# Patient Record
Sex: MALE | Race: WHITE | NOT HISPANIC OR LATINO | Employment: STUDENT | URBAN - METROPOLITAN AREA
[De-identification: names, ages, dates, MRNs, and addresses within clinical notes are randomized per-mention and may not be internally consistent; named-entity substitution may affect disease eponyms.]

---
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
## 2019-01-04 RX ORDER — METHYLPHENIDATE HYDROCHLORIDE 10 MG/1
TABLET ORAL
Qty: 120 TABLET | Refills: 0 | Status: SHIPPED | OUTPATIENT
Start: 2019-03-04 | End: 2019-04-05

## 2019-01-04 RX ORDER — METHYLPHENIDATE HYDROCHLORIDE 10 MG/1
TABLET ORAL
Qty: 120 TABLET | Refills: 0 | Status: SHIPPED | OUTPATIENT
Start: 2019-02-04 | End: 2019-04-05

## 2019-01-04 RX ORDER — METHYLPHENIDATE HYDROCHLORIDE 10 MG/1
TABLET ORAL
Qty: 120 TABLET | Refills: 0 | Status: SHIPPED | OUTPATIENT
Start: 2019-01-04 | End: 2019-04-05

## 2019-01-04 NOTE — TELEPHONE ENCOUNTER
"Can 2 more Rx's be \"teed up \" for Davidson? Mom is coming to get these scripts soon. Thank you    "

## 2019-03-11 ENCOUNTER — TRANSFERRED RECORDS (OUTPATIENT)
Dept: HEALTH INFORMATION MANAGEMENT | Facility: CLINIC | Age: 19
End: 2019-03-11

## 2019-03-26 NOTE — PATIENT INSTRUCTIONS
Check out on-campus health services (to see if they'll assume the ADHD refills while you're on campus)   <-- Click to add NO significant Past Surgical History

## 2019-04-03 ENCOUNTER — TELEPHONE (OUTPATIENT)
Dept: FAMILY MEDICINE | Facility: CLINIC | Age: 19
End: 2019-04-03

## 2019-04-03 NOTE — TELEPHONE ENCOUNTER
Mother states she would like to talk with [facility] about coordinating Sons Medication and care.  Please call.  Doesn't have consent to communicate on file.  Thank You.

## 2019-04-04 NOTE — TELEPHONE ENCOUNTER
Spoke with mother.  Informed her that I don't have consent to give her information on her son.  Advised mom that I can take info from her.  Mother stated that patient's OCD and anxiety are really out of control.  Medications do not seem to be working.  Mother requesting an appointment with PCP for tomorrow, to discuss med's and possibly insight blood testing.  Patient has been to the ER recently so scheduled patient as a ED follow up.

## 2019-04-04 NOTE — PROGRESS NOTES
SUBJECTIVE:   Jarad Nolasco is a 18 year old male who presents to clinic today for the following health issues:    Racing thoughts. Restless. Not sleeping all that well. Some agitation and anger. Occasional mood variability. Some paranoia. Worries about school work. Some ocd behavior. Paces and showers often. Has been attending day treatment up at college. Notes some depression   Depression and Anxiety Follow-Up    Status since last visit: Worsened multiple ER visits due anxiety/depression in the past month.     Other associated symptoms:None    Complicating factors:     Significant life event: No     Current substance abuse: None     PHQ 8/7/2018 11/20/2018   PHQ-9 Total Score 7 10   Q9: Thoughts of better off dead/self-harm past 2 weeks Not at all Not at all     FLORINDA-7 SCORE 8/7/2018 11/20/2018   Total Score 7 10       PHQ-9  English  PHQ-9   Any Language  FLORINDA-7  Suicide Assessment Five-step Evaluation and Treatment (SAFE-T)    Amount of exercise or physical activity: 2-3 days/week for an average of 30-45 minutes    Problems taking medications regularly: No    Medication side effects: none                Problem list and histories reviewed & adjusted, as indicated.  Additional history: as documented    Patient Active Problem List   Diagnosis     Mild persistent asthma     Peanut allergy     FLORINDA (generalized anxiety disorder)     Attention deficit disorder, unspecified hyperactivity presence     Primary insomnia     No past surgical history on file.    Social History     Tobacco Use     Smoking status: Never Smoker     Smokeless tobacco: Never Used   Substance Use Topics     Alcohol use: No     Family History   Problem Relation Age of Onset     Breast Cancer Maternal Grandmother      Heart Disease Maternal Grandfather         Pacemaker     Heart Disease Paternal Grandfather          Current Outpatient Medications   Medication Sig Dispense Refill     albuterol (VENTOLIN HFA) 108 (90 Base) MCG/ACT Inhaler Inhale  1-2 puffs into the lungs every 4 hours as needed for shortness of breath / dyspnea or wheezing 18 g 11     buPROPion (WELLBUTRIN XL) 150 MG 24 hr tablet Take 1 tablet (150 mg) by mouth every morning 90 tablet 1     EPINEPHrine (EPIPEN 2-DOUGLAS) 0.3 MG/0.3ML injection 2-pack Inject 0.3 mLs (0.3 mg) into the muscle once as needed for anaphylaxis 2 mL 1     fluvoxaMINE (LUVOX) 100 MG tablet Take 3 tablets (300 mg) by mouth At Bedtime 270 tablet 1     hydrOXYzine (ATARAX) 50 MG tablet Take 1 tablet by mouth every 6 hours as needed  1     LORazepam (ATIVAN) 0.5 MG tablet Take 1 tablet by mouth every 6 hours as needed  0     traZODone (DESYREL) 50 MG tablet Take 1 tablet (50 mg) by mouth At Bedtime 90 tablet 3     triamcinolone (KENALOG) 0.1 % external cream Apply topically 2 times daily 80 g 0     VYVANSE 50 MG capsule Take 1 capsule by mouth every morning  0     Allergies   Allergen Reactions     Peanuts [Nuts] Anaphylaxis, Nausea and Vomiting and Swelling     Recent Labs   Lab Test 05/01/18  0901   LDL 97   HDL 54   TRIG 112*      BP Readings from Last 3 Encounters:   04/05/19 126/82   11/20/18 136/84   08/07/18 121/75    Wt Readings from Last 3 Encounters:   04/05/19 109.1 kg (240 lb 9.6 oz) (99 %)*   11/20/18 111.6 kg (246 lb) (>99 %)*   08/07/18 115.2 kg (254 lb) (>99 %)*     * Growth percentiles are based on CDC (Boys, 2-20 Years) data.                  Labs reviewed in EPIC    Reviewed and updated as needed this visit by clinical staff  Tobacco  Allergies  Meds       Reviewed and updated as needed this visit by Provider         All other systems negative except as outline above  OBJECTIVE:  Eye exam - right eye normal lid, conjunctiva, cornea, pupil and fundus, left eye normal lid, conjunctiva, cornea, pupil and fundus.  Thyroid not palpable, not enlarged, no nodules detected.  CHEST:chest clear to IPPA, no tachypnea, retractions or cyanosis and S1, S2 normal, no murmur, no gallop, rate regular.    Jarad was  seen today for anxiety.    Diagnoses and all orders for this visit:    FLORINDA (generalized anxiety disorder)    Moderate episode of recurrent major depressive disorder (H)  -     fluvoxaMINE (LUVOX) 100 MG tablet; Take 3 tablets (300 mg) by mouth At Bedtime  -     buPROPion (WELLBUTRIN XL) 150 MG 24 hr tablet; Take 1 tablet (150 mg) by mouth every morning    Other obsessive-compulsive disorders  -     fluvoxaMINE (LUVOX) 100 MG tablet; Take 3 tablets (300 mg) by mouth At Bedtime    Eczema, unspecified type  -     triamcinolone (KENALOG) 0.1 % external cream; Apply topically 2 times daily      Advised supportive and symptomatic treatment.  Follow up with Provider - if condition persists or worsens.   work on lifestyle modification

## 2019-04-04 NOTE — TELEPHONE ENCOUNTER
Mom is calling to request call back, has waited 24 hours and would like a call back. Please call to advise. Thank you.

## 2019-04-05 ENCOUNTER — OFFICE VISIT (OUTPATIENT)
Dept: FAMILY MEDICINE | Facility: CLINIC | Age: 19
End: 2019-04-05
Payer: COMMERCIAL

## 2019-04-05 VITALS
BODY MASS INDEX: 36.46 KG/M2 | RESPIRATION RATE: 22 BRPM | WEIGHT: 240.6 LBS | OXYGEN SATURATION: 97 % | DIASTOLIC BLOOD PRESSURE: 82 MMHG | HEIGHT: 68 IN | TEMPERATURE: 98.1 F | SYSTOLIC BLOOD PRESSURE: 126 MMHG | HEART RATE: 83 BPM

## 2019-04-05 DIAGNOSIS — L30.9 ECZEMA, UNSPECIFIED TYPE: ICD-10-CM

## 2019-04-05 DIAGNOSIS — F33.1 MODERATE EPISODE OF RECURRENT MAJOR DEPRESSIVE DISORDER (H): ICD-10-CM

## 2019-04-05 DIAGNOSIS — F41.1 GAD (GENERALIZED ANXIETY DISORDER): Primary | ICD-10-CM

## 2019-04-05 DIAGNOSIS — F42.8 OTHER OBSESSIVE-COMPULSIVE DISORDERS: ICD-10-CM

## 2019-04-05 PROCEDURE — 99214 OFFICE O/P EST MOD 30 MIN: CPT | Performed by: PHYSICIAN ASSISTANT

## 2019-04-05 RX ORDER — LORAZEPAM 0.5 MG/1
1 TABLET ORAL EVERY 6 HOURS PRN
Refills: 0 | COMMUNITY
Start: 2019-03-11 | End: 2021-12-24

## 2019-04-05 RX ORDER — FLUVOXAMINE MALEATE 100 MG
300 TABLET ORAL AT BEDTIME
Qty: 270 TABLET | Refills: 1 | Status: SHIPPED | OUTPATIENT
Start: 2019-04-05 | End: 2021-07-23

## 2019-04-05 RX ORDER — TRIAMCINOLONE ACETONIDE 1 MG/G
CREAM TOPICAL 2 TIMES DAILY
Qty: 80 G | Refills: 0 | Status: SHIPPED | OUTPATIENT
Start: 2019-04-05 | End: 2021-12-24

## 2019-04-05 RX ORDER — BUPROPION HYDROCHLORIDE 150 MG/1
150 TABLET ORAL EVERY MORNING
Qty: 90 TABLET | Refills: 1 | Status: SHIPPED | OUTPATIENT
Start: 2019-04-05 | End: 2021-07-23

## 2019-04-05 RX ORDER — FLUVOXAMINE MALEATE 100 MG
1 TABLET ORAL 2 TIMES DAILY
Refills: 1 | COMMUNITY
Start: 2019-03-14 | End: 2019-04-05

## 2019-04-05 RX ORDER — LISDEXAMFETAMINE DIMESYLATE 50 MG
1 CAPSULE ORAL EVERY MORNING
Refills: 0 | COMMUNITY
Start: 2019-03-11 | End: 2021-12-24

## 2019-04-05 RX ORDER — HYDROXYZINE HYDROCHLORIDE 50 MG/1
1 TABLET, FILM COATED ORAL EVERY 6 HOURS PRN
Refills: 1 | COMMUNITY
Start: 2019-02-12 | End: 2021-12-24

## 2019-04-05 ASSESSMENT — MIFFLIN-ST. JEOR: SCORE: 2087.6

## 2019-04-05 ASSESSMENT — ANXIETY QUESTIONNAIRES
2. NOT BEING ABLE TO STOP OR CONTROL WORRYING: NEARLY EVERY DAY
7. FEELING AFRAID AS IF SOMETHING AWFUL MIGHT HAPPEN: MORE THAN HALF THE DAYS
GAD7 TOTAL SCORE: 16
IF YOU CHECKED OFF ANY PROBLEMS ON THIS QUESTIONNAIRE, HOW DIFFICULT HAVE THESE PROBLEMS MADE IT FOR YOU TO DO YOUR WORK, TAKE CARE OF THINGS AT HOME, OR GET ALONG WITH OTHER PEOPLE: VERY DIFFICULT
6. BECOMING EASILY ANNOYED OR IRRITABLE: SEVERAL DAYS
5. BEING SO RESTLESS THAT IT IS HARD TO SIT STILL: SEVERAL DAYS
3. WORRYING TOO MUCH ABOUT DIFFERENT THINGS: NEARLY EVERY DAY
1. FEELING NERVOUS, ANXIOUS, OR ON EDGE: NEARLY EVERY DAY

## 2019-04-05 ASSESSMENT — PATIENT HEALTH QUESTIONNAIRE - PHQ9
SUM OF ALL RESPONSES TO PHQ QUESTIONS 1-9: 11
5. POOR APPETITE OR OVEREATING: NEARLY EVERY DAY

## 2019-04-06 ASSESSMENT — ANXIETY QUESTIONNAIRES: GAD7 TOTAL SCORE: 16

## 2019-04-17 ENCOUNTER — TELEPHONE (OUTPATIENT)
Dept: FAMILY MEDICINE | Facility: CLINIC | Age: 19
End: 2019-04-17

## 2019-04-17 DIAGNOSIS — F98.8 ATTENTION DEFICIT DISORDER, UNSPECIFIED HYPERACTIVITY PRESENCE: ICD-10-CM

## 2019-04-17 DIAGNOSIS — F41.1 GAD (GENERALIZED ANXIETY DISORDER): Primary | ICD-10-CM

## 2019-04-17 NOTE — TELEPHONE ENCOUNTER
Mother is calling to inform provider that she has found a provider that will do the genetic testing would like to discuss this with him    Thank you       Caller informed that calls received after 3pm may not be returned same day.

## 2019-04-18 NOTE — TELEPHONE ENCOUNTER
Spoke with Mom, she wants [facility] to contact [facility] in regards to testing to  see which medications are best for her son, she has a co-worker who has done this. TC placed call to [facility] to see if recommended NICOLETTE Wahl does this [phone]. [facility].

## 2019-04-23 NOTE — TELEPHONE ENCOUNTER
[facility] in [place] would see patient for an intake appointment with Sera Wahl, it would then be determined if any testing is to be done. [place] [facility] ph [phone] / fax [phone]. Still no auth on file to speak to Mom, left message for patient to return call.

## 2019-04-24 ENCOUNTER — TELEPHONE (OUTPATIENT)
Dept: FAMILY MEDICINE | Facility: CLINIC | Age: 19
End: 2019-04-24

## 2019-04-24 NOTE — TELEPHONE ENCOUNTER
Spoke with Mom, she would like her son to go to [facility] for this gene site testing.

## 2019-04-25 NOTE — TELEPHONE ENCOUNTER
Mental Health Order pended, unsure of which Dx to place. Will fax to [facility] when done.

## 2019-05-23 ENCOUNTER — TELEPHONE (OUTPATIENT)
Dept: FAMILY MEDICINE | Facility: CLINIC | Age: 19
End: 2019-05-23

## 2019-05-23 NOTE — LETTER
[facility] [place]  [address] [place] [address]  Phone: [phone]    June 5, 2019        Jarad Nolasco  [address] [address]          To whom it may concern:    RE: Jarad Nolasco    We have been trying to reach you by phone: Jarad needs an appointment with our ancillary nurse for a TB skin test in order to complete the requested form. We are returning the form to you, please bring it in when you arrive to your nurse visit.       Please contact me for questions or concerns.      Sincerely,    [facility] Elpidio/leena

## 2019-05-29 NOTE — TELEPHONE ENCOUNTER
Patient needs to be seen. Paperwork back to [facility]. Will schedule patient with provider. Thank you

## 2019-05-31 NOTE — TELEPHONE ENCOUNTER
I left a message on mom'boom abdul's personal voicemail. I asked if the patient has ever had a BCG vaccine, if he has not, he will need to make an appointment on our ancillary schedule for a mantoux test. I left the pod 1 number for mom to call back.      Forms placed in black vertical file in pod 1.

## 2019-06-17 ENCOUNTER — TELEPHONE (OUTPATIENT)
Dept: FAMILY MEDICINE | Facility: CLINIC | Age: 19
End: 2019-06-17

## 2019-06-17 NOTE — TELEPHONE ENCOUNTER
Mother states patient has numbness and tingling in his hands.  Patient not with her.  Triaged call.

## 2019-06-17 NOTE — TELEPHONE ENCOUNTER
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   Improves/worsens symptoms:  NA  Last exam/Treatment:  4/5/19  Allergies:   Allergies   Allergen Reactions     Peanuts [Nuts] Anaphylaxis, Nausea and Vomiting and Swelling       NURSING PLAN: Nursing advice to patient see provider today for examination and monitor for any changes in current symptoms, if worse seek ER evaluation     RECOMMENDED DISPOSITION:  See in 24 hours - appointment scheduled with provider in [place] today.  Will comply with recommendation: Yes  If further questions/concerns or if symptoms do not improve, worsen or new symptoms develop, call your PCP or [facility] as soon as possible.      Guideline used:  Clinical References    Shadia Marcos RN

## 2019-06-20 ENCOUNTER — OFFICE VISIT (OUTPATIENT)
Dept: FAMILY MEDICINE | Facility: CLINIC | Age: 19
End: 2019-06-20
Payer: COMMERCIAL

## 2019-06-20 VITALS
SYSTOLIC BLOOD PRESSURE: 132 MMHG | OXYGEN SATURATION: 99 % | BODY MASS INDEX: 36.19 KG/M2 | RESPIRATION RATE: 18 BRPM | DIASTOLIC BLOOD PRESSURE: 83 MMHG | WEIGHT: 241 LBS | HEART RATE: 78 BPM

## 2019-06-20 DIAGNOSIS — Z11.1 SCREENING EXAMINATION FOR PULMONARY TUBERCULOSIS: ICD-10-CM

## 2019-06-20 DIAGNOSIS — G56.02 CARPAL TUNNEL SYNDROME OF LEFT WRIST: Primary | ICD-10-CM

## 2019-06-20 PROCEDURE — 99213 OFFICE O/P EST LOW 20 MIN: CPT | Performed by: PHYSICIAN ASSISTANT

## 2019-06-20 RX ORDER — DICLOFENAC SODIUM 75 MG/1
75 TABLET, DELAYED RELEASE ORAL 2 TIMES DAILY
Qty: 30 TABLET | Refills: 0 | Status: SHIPPED | OUTPATIENT
Start: 2019-06-20 | End: 2021-12-24

## 2019-06-20 NOTE — PROGRESS NOTES
Subjective     Jarad Nolasco is a 18 year old male who presents to clinic today for the following health issues:    HPI   Bilateral wrist/hand numbness since starting new job       Duration: 1 month    Description (location/character/radiation):   Bilateral hand and wrist numbness increasing since starting new job over the past month.     Intensity:  moderate    Accompanying signs and symptoms:     History (similar episodes/previous evaluation): None    Precipitating or alleviating factors: None    Therapies tried and outcome: None     Left hand numbness.   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   Constant in left hand.   No neck pain.   No profound hand or wrist pain.     BP Readings from Last 3 Encounters:   06/20/19 132/83   04/05/19 126/82   11/20/18 136/84    Wt Readings from Last 3 Encounters:   06/20/19 109.3 kg (241 lb) (99 %)*   04/05/19 109.1 kg (240 lb 9.6 oz) (99 %)*   11/20/18 111.6 kg (246 lb) (>99 %)*     * Growth percentiles are based on CDC (Boys, 2-20 Years) data.                      Reviewed and updated as needed this visit by Provider         Review of Systems   ROS COMP: Constitutional, HEENT, cardiovascular, pulmonary, GI, , musculoskeletal, neuro, skin, endocrine and psych systems are negative, except as otherwise noted.      Objective    /83   Pulse 78   Resp 18   Wt 109.3 kg (241 lb)   SpO2 99%   BMI 36.19 kg/m    Body mass index is 36.19 kg/m .  Physical Exam     Neck rom normal. No pain. Negative spurlings test.   Dec  strength. Left worse than right .   UE strength , rom and dtr's normal and symmetric.   Mild positive tinels sign and phalens test especially on the left.     Jarad was seen today for numbness.    Diagnoses and all orders for this visit:    Carpal tunnel syndrome of left wrist  -     diclofenac (VOLTAREN) 75 MG EC tablet; Take 1 tablet (75 mg) by mouth 2 times daily  -     order for DME; Equipment being ordered: wrist splint  -     NEUROLOGY ADULT  REFERRAL  -     EMG; Future    Screening examination for pulmonary tuberculosis  -     TB INTRADERMAL TEST; Future      work on lifestyle modification  Advised supportive and symptomatic treatment.  Follow up with Provider - if condition persists or worsens.

## 2019-06-24 ENCOUNTER — ALLIED HEALTH/NURSE VISIT (OUTPATIENT)
Dept: NURSING | Facility: CLINIC | Age: 19
End: 2019-06-24
Payer: COMMERCIAL

## 2019-06-24 DIAGNOSIS — Z11.1 SCREENING EXAMINATION FOR PULMONARY TUBERCULOSIS: Primary | ICD-10-CM

## 2019-06-24 PROCEDURE — 86580 TB INTRADERMAL TEST: CPT

## 2019-06-24 PROCEDURE — 99207 ZZC NO CHARGE NURSE ONLY: CPT

## 2019-06-26 ENCOUNTER — ALLIED HEALTH/NURSE VISIT (OUTPATIENT)
Dept: NURSING | Facility: CLINIC | Age: 19
End: 2019-06-26
Payer: COMMERCIAL

## 2019-06-26 DIAGNOSIS — Z11.1 SCREENING EXAMINATION FOR PULMONARY TUBERCULOSIS: Primary | ICD-10-CM

## 2019-06-26 LAB
PPDINDURATION: 0 MM (ref 0–5)
PPDREDNESS: 0 MM

## 2019-06-26 PROCEDURE — 99207 ZZC NO CHARGE NURSE ONLY: CPT

## 2019-06-26 NOTE — NURSING NOTE
Mantoux result:  Lab Results   Component Value Date    PPDREDNESS 0 06/26/2019    PPDINDURATIO 0 06/26/2019     Is induration greater than 5mm?  No     8:05 AM  06/26/19    Shadia Marcos RN, BSN, PHN

## 2019-12-23 ENCOUNTER — TELEPHONE (OUTPATIENT)
Dept: FAMILY MEDICINE | Facility: CLINIC | Age: 19
End: 2019-12-23

## 2019-12-23 NOTE — TELEPHONE ENCOUNTER
Panel Management Review    Summary:    Patient is due/failing the following:   Influenza and HPV vaccines, AAP, ACT, PHQ9 and PHYSICAL    Type of outreach:    Phone, spoke to patient.  He will call back after her speaks to his parents.                                                                                                                                   Emilie Randhawa [facility]     Chart routed to Care Team .

## 2020-01-06 ENCOUNTER — ALLIED HEALTH/NURSE VISIT (OUTPATIENT)
Dept: NURSING | Facility: CLINIC | Age: 20
End: 2020-01-06
Payer: COMMERCIAL

## 2020-01-06 VITALS — TEMPERATURE: 97.3 F

## 2020-01-06 DIAGNOSIS — Z23 NEED FOR PROPHYLACTIC VACCINATION AND INOCULATION AGAINST INFLUENZA: Primary | ICD-10-CM

## 2020-01-06 PROCEDURE — 90651 9VHPV VACCINE 2/3 DOSE IM: CPT

## 2020-01-06 PROCEDURE — 90472 IMMUNIZATION ADMIN EACH ADD: CPT

## 2020-01-06 PROCEDURE — 90471 IMMUNIZATION ADMIN: CPT

## 2020-01-06 PROCEDURE — 90686 IIV4 VACC NO PRSV 0.5 ML IM: CPT

## 2020-02-25 DIAGNOSIS — J45.30 MILD PERSISTENT ASTHMA WITHOUT COMPLICATION: ICD-10-CM

## 2020-02-25 NOTE — TELEPHONE ENCOUNTER
Requested Prescriptions   Pending Prescriptions Disp Refills     albuterol (VENTOLIN HFA) 108 (90 Base) MCG/ACT inhaler 18 g 11     Sig: Inhale 1-2 puffs into the lungs every 4 hours as needed for shortness of breath / dyspnea or wheezing  Last Written Prescription Date:  7/1/19  Last Fill Quantity: 18 g,  # refills: 11   Last office visit: 8/7/2018 with prescribing provider:  6/20/19 ALICE Perez  Future Office Visit:         There is no refill protocol information for this order

## 2020-02-26 NOTE — TELEPHONE ENCOUNTER
"Routing refill request to provider for review/approval because:  Labs not current:  Asthma Control Test 11/20/2018   ACT Total Score (Goal Greater than or Equal to 20) 22     Pt overdue for follow up and routine visit.     Med pended with reminder          Requested Prescriptions   Pending Prescriptions Disp Refills     albuterol (VENTOLIN HFA) 108 (90 Base) MCG/ACT inhaler 18 g 11     Sig: Inhale 1-2 puffs into the lungs every 4 hours as needed for shortness of breath / dyspnea or wheezing       Asthma Maintenance Inhalers - Anticholinergics Failed - 2/25/2020 11:08 AM        Failed - Asthma control assessment score within normal limits in last 6 months     Please review ACT score.           Failed - Recent (6 mo) or future (30 days) visit within the authorizing provider's specialty     Patient had office visit in the last 6 months or has a visit in the next 30 days with authorizing provider or within the authorizing provider's specialty.  See \"Patient Info\" tab in inbasket, or \"Choose Columns\" in Meds & Orders section of the refill encounter.            Passed - Patient is age 12 years or older        Passed - Medication is active on med list          "

## 2020-02-27 RX ORDER — ALBUTEROL SULFATE 90 UG/1
1-2 AEROSOL, METERED RESPIRATORY (INHALATION) EVERY 4 HOURS PRN
Qty: 18 G | Refills: 0 | Status: SHIPPED | OUTPATIENT
Start: 2020-02-27 | End: 2020-06-03

## 2020-03-10 ENCOUNTER — TELEPHONE (OUTPATIENT)
Dept: FAMILY MEDICINE | Facility: CLINIC | Age: 20
End: 2020-03-10

## 2020-03-10 NOTE — TELEPHONE ENCOUNTER
Panel Management Review    Summary:    Patient is due/failing the following:   AAP, ACT, PHQ9 and PHYSICAL    Type of outreach:    Sent letter.                                                                                                                                  Emilie Randhawa CMA     Chart routed to Care Team .

## 2020-03-10 NOTE — LETTER
March 10, 2020      Jarad Nolasco  [address]  [place] [address]        Dear Jarad,     In order to ensure we are providing the best quality care, we have reviewed your chart and see that you are due for the following.  1. Your next office visit for a physical exam is due  2. Asthma control test - see attached, please complete and return this at your convenience  3. PHQ-9 - see attached, please complete and return this at your convenience.      We greatly appreciate the opportunity to serve you.  Thank you for trusting us with your health care.    Sincerely,    [facility] [place] Team     Davidson Perez PA-C

## 2020-03-10 NOTE — LETTER
My Asthma Action Plan    Name: Jarad Nolasco   YOB: 2000  Date: 3/10/2020   My doctor: [facility] Perez PA-C   My clinic: [facility] [place]        My Rescue Medicine:   Albuterol inhaler (Proair/Ventolin/Proventil HFA)  2-4 puffs EVERY 4 HOURS as needed. Use a spacer if recommended by your provider.   My Asthma Severity: Intermittent / Exercise Induced  Know your asthma triggers:              GREEN ZONE   Good Control    I feel good    No cough or wheeze    Can work, sleep and play without asthma symptoms       Take your asthma control medicine every day.     1. If exercise triggers your asthma, take your rescue medication    15 minutes before exercise or sports, and    During exercise if you have asthma symptoms  2. Spacer to use with inhaler: If you have a spacer, make sure to use it with your inhaler             YELLOW ZONE Getting Worse  I have ANY of these:    I do not feel good    Cough or wheeze    Chest feels tight    Wake up at night   1. Keep taking your Green Zone medications  2. Start taking your rescue medicine:    every 20 minutes for up to 1 hour. Then every 4 hours for 24-48 hours.  3. If you stay in the Yellow Zone for more than 12-24 hours, contact your doctor.  4. If you do not return to the Green Zone in 12-24 hours or you get worse, start taking your oral steroid medicine if prescribed by your provider.           RED ZONE Medical Alert - Get Help  I have ANY of these:    I feel awful    Medicine is not helping    Breathing getting harder    Trouble walking or talking    Nose opens wide to breathe       1. Take your rescue medicine NOW  2. If your provider has prescribed an oral steroid medicine, start taking it NOW  3. Call your doctor NOW  4. If you are still in the Red Zone after 20 minutes and you have not reached your doctor:    Take your rescue medicine again and    Call 911 or go to the emergency room right away    See your regular doctor within 2 weeks of an  Emergency Room or Urgent Care visit for follow-up treatment.          Annual Reminders:  Meet with Asthma Educator,  Flu Shot in the Fall, consider Pneumonia Vaccination for patients with asthma (aged 19 and older).    Pharmacy: [id number] - [place] - [address] AT [facility] [address]    Electronically signed by Davidson Perez PA-C   Date: 03/10/20                    Asthma Triggers  How To Control Things That Make Your Asthma Worse    Triggers are things that make your asthma worse.  Look at the list below to help you find your triggers and   what you can do about them. You can help prevent asthma flare-ups by staying away from your triggers.      Trigger                                                          What you can do   Cigarette Smoke  Tobacco smoke can make asthma worse. Do not allow smoking in your home, car or around you.  Be sure no one smokes at a child s day care or school.  If you smoke, ask your health care provider for ways to help you quit.  Ask family members to quit too.  Ask your health care provider for a referral to Quit Plan to help you quit smoking, or call 0-519-351-PLAN.     Colds, Flu, Bronchitis  These are common triggers of asthma. Wash your hands often.  Don t touch your eyes, nose or mouth.  Get a flu shot every year.     Dust Mites  These are tiny bugs that live in cloth or carpet. They are too small to see. Wash sheets and blankets in hot water every week.   Encase pillows and mattress in dust mite proof covers.  Avoid having carpet if you can. If you have carpet, vacuum weekly.   Use a dust mask and HEPA vacuum.   Pollen and Outdoor Mold  Some people are allergic to trees, grass, or weed pollen, or molds. Try to keep your windows closed.  Limit time out doors when pollen count is high.   Ask you health care provider about taking medicine during allergy season.     Animal Dander  Some people are allergic to skin flakes, urine or saliva  from pets with fur or feathers. Keep pets with fur or feathers out of your home.    If you can t keep the pet outdoors, then keep the pet out of your bedroom.  Keep the bedroom door closed.  Keep pets off cloth furniture and away from stuffed toys.     Mice, Rats, and Cockroaches  Some people are allergic to the waste from these pests.   Cover food and garbage.  Clean up spills and food crumbs.  Store grease in the refrigerator.   Keep food out of the bedroom.   Indoor Mold  This can be a trigger if your home has high moisture. Fix leaking faucets, pipes, or other sources of water.   Clean moldy surfaces.  Dehumidify basement if it is damp and smelly.   Smoke, Strong Odors, and Sprays  These can reduce air quality. Stay away from strong odors and sprays, such as perfume, powder, hair spray, paints, smoke incense, paint, cleaning products, candles and new carpet.   Exercise or Sports  Some people with asthma have this trigger. Be active!  Ask your doctor about taking medicine before sports or exercise to prevent symptoms.    Warm up for 5-10 minutes before and after sports or exercise.     Other Triggers of Asthma  Cold air:  Cover your nose and mouth with a scarf.  Sometimes laughing or crying can be a trigger.  Some medicines and food can trigger asthma.

## 2020-03-30 ENCOUNTER — TELEPHONE (OUTPATIENT)
Dept: FAMILY MEDICINE | Facility: CLINIC | Age: 20
End: 2020-03-30

## 2020-03-30 NOTE — TELEPHONE ENCOUNTER
"Signed \"Consent to Communicate\" on file.     Left message on voice mail for patient or his mom Christianne to call clinic. [phone]    Susanna Ardon RN BSN      "

## 2020-03-30 NOTE — TELEPHONE ENCOUNTER
Mom states Jarad has swollen glands under both arms for a couple of weeks now. She wants to make him an appt. Please call Mom's phone and she will get him up for the nurse to talk with. Ok to leave a detailed message.

## 2020-04-01 NOTE — TELEPHONE ENCOUNTER
Spoke with mom and she reports, we can disregard this message, they have gotten instructions and information through family member.  She will have pharmacy send refill requests for any medications needed.  Mom reports he has had a medication prescribed by a provider out of state in [place] college town and will have this sent to [facility] for refills if the prescribing provider is unable to fill.  And discussed if needed can do a telephone visit.  Annette Cam RN

## 2020-05-28 ENCOUNTER — TELEPHONE (OUTPATIENT)
Dept: FAMILY MEDICINE | Facility: CLINIC | Age: 20
End: 2020-05-28

## 2020-05-28 NOTE — LETTER
May 28, 2020      Jarad Nolasco  [address]  [place] [address]          Dear Jarad,     In order to ensure we are providing the best quality care, we have reviewed your chart and see that you are due for the following.    1. Asthma Control Test - attached (please return at your convenience)  2. PHQ-9 Depression Screen - attached (please return at your convenience)      We greatly appreciate the opportunity to serve you.  Thank you for trusting us with your health care.    Sincerely,    [facility] [place] Team           Sincerely,        [facility]son Perez PA-C

## 2020-05-28 NOTE — TELEPHONE ENCOUNTER
Panel Management Review    Summary:    Patient is due/failing the following:   ACT and PHQ9    Type of outreach:    Sent letter.                                                                                                                                  Emilie Randhawa CMA     Chart routed to Care Team .

## 2020-06-03 DIAGNOSIS — J45.30 MILD PERSISTENT ASTHMA WITHOUT COMPLICATION: ICD-10-CM

## 2020-06-05 RX ORDER — ALBUTEROL SULFATE 90 UG/1
AEROSOL, METERED RESPIRATORY (INHALATION)
Qty: 18 G | Refills: 0 | Status: SHIPPED | OUTPATIENT
Start: 2020-06-05 | End: 2020-06-22

## 2020-06-20 DIAGNOSIS — J45.30 MILD PERSISTENT ASTHMA WITHOUT COMPLICATION: ICD-10-CM

## 2020-06-23 RX ORDER — ALBUTEROL SULFATE 90 UG/1
AEROSOL, METERED RESPIRATORY (INHALATION)
Qty: 8.5 G | Refills: 0 | Status: SHIPPED | OUTPATIENT
Start: 2020-06-23 | End: 2021-08-08

## 2020-06-23 NOTE — TELEPHONE ENCOUNTER
"Routing refill request to provider for review/approval because:  Labs not current:  act  Patient needs to be seen because it has been more than 1 year since last office visit.  6/20/19    Medication pended for approval, 30 day supply with reminder.                 Requested Prescriptions   Pending Prescriptions Disp Refills     albuterol (PROAIR HFA/PROVENTIL HFA/VENTOLIN HFA) 108 (90 Base) MCG/ACT inhaler [Pharmacy Med Name: ALBUTEROL HFA INH (200 PUFFS) 18GM] 8.5 g 0     Sig: INHALE 1 TO 2 PUFFS INTO THE LUNGS EVERY 4 HOURS AS NEEDED FOR SHORTNESS OF BREATH OR DIFFICULT BREATHING OR WHEEZING       Asthma Maintenance Inhalers - Anticholinergics Failed - 6/22/2020  9:21 AM        Failed - Asthma control assessment score within normal limits in last 6 months     Please review ACT score.           Failed - Recent (6 mo) or future (30 days) visit within the authorizing provider's specialty     Patient had office visit in the last 6 months or has a visit in the next 30 days with authorizing provider or within the authorizing provider's specialty.  See \"Patient Info\" tab in inbasket, or \"Choose Columns\" in Meds & Orders section of the refill encounter.            Passed - Patient is age 12 years or older        Passed - Medication is active on med list       Short-Acting Beta Agonist Inhalers Protocol  Failed - 6/22/2020  9:21 AM        Failed - Asthma control assessment score within normal limits in last 6 months     Please review ACT score.           Failed - Recent (6 mo) or future (30 days) visit within the authorizing provider's specialty     Patient had office visit in the last 6 months or has a visit in the next 30 days with authorizing provider or within the authorizing provider's specialty.  See \"Patient Info\" tab in inbasket, or \"Choose Columns\" in Meds & Orders section of the refill encounter.            Passed - Patient is age 12 or older        Passed - Medication is active on med list             "

## 2020-06-23 NOTE — TELEPHONE ENCOUNTER
justine is due for a visit with me. Schedule him for a virtual (video or phone based on patient preference. Always promote a video visit first) visit with me.

## 2021-03-04 NOTE — TELEPHONE ENCOUNTER
"Routing refill request to provider for review/approval because:  Labs not current:  Act  Asthma Control Test 11/20/2018   ACT Total Score (Goal Greater than or Equal to 20) 22     Patient needs to be seen because:  Last OV 6/20/19            Requested Prescriptions   Pending Prescriptions Disp Refills     VENTOLIN  (90 Base) MCG/ACT inhaler [Pharmacy Med Name: VENTOLIN HFA INH W/DOS CTR 200PUFFS] 18 g 0     Sig: INHALE 1 TO 2 PUFFS INTO THE LUNGS EVERY 4 HOURS AS NEEDED FOR SHORTNESS OF BREATH OR DIFFICULT BREATHING OR WHEEZING       Asthma Maintenance Inhalers - Anticholinergics Failed - 6/3/2020 11:10 AM        Failed - Asthma control assessment score within normal limits in last 6 months     Please review ACT score.           Failed - Recent (6 mo) or future (30 days) visit within the authorizing provider's specialty     Patient had office visit in the last 6 months or has a visit in the next 30 days with authorizing provider or within the authorizing provider's specialty.  See \"Patient Info\" tab in inbasket, or \"Choose Columns\" in Meds & Orders section of the refill encounter.            Passed - Patient is age 12 years or older        Passed - Medication is active on med list       Short-Acting Beta Agonist Inhalers Protocol  Failed - 6/3/2020 11:10 AM        Failed - Asthma control assessment score within normal limits in last 6 months     Please review ACT score.           Failed - Recent (6 mo) or future (30 days) visit within the authorizing provider's specialty     Patient had office visit in the last 6 months or has a visit in the next 30 days with authorizing provider or within the authorizing provider's specialty.  See \"Patient Info\" tab in inbasket, or \"Choose Columns\" in Meds & Orders section of the refill encounter.            Passed - Patient is age 12 or older        Passed - Medication is active on med list             " Add 52 Modifier (Optional): no Number Of Freeze-Thaw Cycles: 1 freeze-thaw cycle Medical Necessity Clause: This procedure was medically necessary because the lesions that were treated were: Render Post-Care Instructions In Note?: yes Medical Necessity Information: It is in your best interest to select a reason for this procedure from the list below. All of these items fulfill various CMS LCD requirements except the new and changing color options. Consent: The patient's consent was obtained including but not limited to risks of crusting, scabbing, blistering, scarring, darker or lighter pigmentary change, recurrence, incomplete removal and infection. Post-Care Instructions: I reviewed with the patient in detail post-care instructions. Patient is to wear sunprotection, and avoid picking at any of the treated lesions. Pt may apply Vaseline to crusted or scabbing areas. Detail Level: Detailed

## 2021-07-20 ENCOUNTER — TELEPHONE (OUTPATIENT)
Dept: FAMILY MEDICINE | Facility: CLINIC | Age: 21
End: 2021-07-20

## 2021-07-20 NOTE — TELEPHONE ENCOUNTER
Patient last seen by Davidson Perez on 6-20-19.  Patient needs to be seen if needing referrals.  Spoke with mother and patient is home for a few weeks so she is wondering if a virtual appointment would be okay when he is home

## 2021-07-20 NOTE — TELEPHONE ENCOUNTER
Reason for Call: Request for an order or referral:    Order or referral being requested:  Patient is needing two referrals. He needs a [facility] (patient already goes there but has new insurance and is needing a new referral because of his insurance) Please fax the referral (mom doesn't know the fax number). Also patient is needing a referral to have allergy testing.  Patient has a peanut allergy but is wanting to know what other foods he has an allergy to.  Please call mom with the referral information.    Date needed: as soon as possible    Has the patient been seen by the PCP for this problem? YES    Additional comments:     Phone number Patient can be reached at:  Home number on file [phone] (home)    Best Time:  any    Can we leave a detailed message on this number?  YES    Call taken on 7/20/2021 at 9:59 AM by Christianne Rojas

## 2021-07-20 NOTE — TELEPHONE ENCOUNTER
Left message on voice mail for parent to call clinic.   when parent or patient returns call, please schedule virtual visit as directed below.  Davidson has openings this week on Thursday and Friday  [phone]  Annette Cam RN  [facility]

## 2021-07-23 ENCOUNTER — VIRTUAL VISIT (OUTPATIENT)
Dept: FAMILY MEDICINE | Facility: CLINIC | Age: 21
End: 2021-07-23
Payer: COMMERCIAL

## 2021-07-23 DIAGNOSIS — F42.8 OTHER OBSESSIVE-COMPULSIVE DISORDERS: ICD-10-CM

## 2021-07-23 DIAGNOSIS — Z91.010 PEANUT ALLERGY: Primary | ICD-10-CM

## 2021-07-23 DIAGNOSIS — F41.1 GAD (GENERALIZED ANXIETY DISORDER): ICD-10-CM

## 2021-07-23 DIAGNOSIS — J45.30 MILD PERSISTENT ASTHMA WITHOUT COMPLICATION: ICD-10-CM

## 2021-07-23 PROCEDURE — 99214 OFFICE O/P EST MOD 30 MIN: CPT | Mod: GT | Performed by: PHYSICIAN ASSISTANT

## 2021-07-23 PROCEDURE — 96127 BRIEF EMOTIONAL/BEHAV ASSMT: CPT | Mod: 59 | Performed by: PHYSICIAN ASSISTANT

## 2021-07-23 RX ORDER — ARIPIPRAZOLE 10 MG/1
TABLET ORAL
COMMUNITY
Start: 2021-05-10 | End: 2021-12-24

## 2021-07-23 RX ORDER — FLUOXETINE 10 MG/1
CAPSULE ORAL
Qty: 60 CAPSULE | Refills: 0 | Status: SHIPPED | OUTPATIENT
Start: 2021-07-23 | End: 2021-12-24

## 2021-07-23 RX ORDER — CLONIDINE HYDROCHLORIDE 0.2 MG/1
0.2 TABLET ORAL
COMMUNITY
Start: 2020-11-12

## 2021-07-23 ASSESSMENT — PATIENT HEALTH QUESTIONNAIRE - PHQ9
SUM OF ALL RESPONSES TO PHQ QUESTIONS 1-9: 1
5. POOR APPETITE OR OVEREATING: NOT AT ALL

## 2021-07-23 ASSESSMENT — ASTHMA QUESTIONNAIRES
QUESTION_3 LAST FOUR WEEKS HOW OFTEN DID YOUR ASTHMA SYMPTOMS (WHEEZING, COUGHING, SHORTNESS OF BREATH, CHEST TIGHTNESS OR PAIN) WAKE YOU UP AT NIGHT OR EARLIER THAN USUAL IN THE MORNING: ONCE OR TWICE
ACT_TOTALSCORE: 21
QUESTION_4 LAST FOUR WEEKS HOW OFTEN HAVE YOU USED YOUR RESCUE INHALER OR NEBULIZER MEDICATION (SUCH AS ALBUTEROL): ONCE A WEEK OR LESS
QUESTION_2 LAST FOUR WEEKS HOW OFTEN HAVE YOU HAD SHORTNESS OF BREATH: ONCE OR TWICE A WEEK
QUESTION_5 LAST FOUR WEEKS HOW WOULD YOU RATE YOUR ASTHMA CONTROL: COMPLETELY CONTROLLED
QUESTION_1 LAST FOUR WEEKS HOW MUCH OF THE TIME DID YOUR ASTHMA KEEP YOU FROM GETTING AS MUCH DONE AT WORK, SCHOOL OR AT HOME: A LITTLE OF THE TIME

## 2021-07-23 ASSESSMENT — ANXIETY QUESTIONNAIRES
2. NOT BEING ABLE TO STOP OR CONTROL WORRYING: SEVERAL DAYS
3. WORRYING TOO MUCH ABOUT DIFFERENT THINGS: MORE THAN HALF THE DAYS
1. FEELING NERVOUS, ANXIOUS, OR ON EDGE: MORE THAN HALF THE DAYS
6. BECOMING EASILY ANNOYED OR IRRITABLE: SEVERAL DAYS
GAD7 TOTAL SCORE: 6
IF YOU CHECKED OFF ANY PROBLEMS ON THIS QUESTIONNAIRE, HOW DIFFICULT HAVE THESE PROBLEMS MADE IT FOR YOU TO DO YOUR WORK, TAKE CARE OF THINGS AT HOME, OR GET ALONG WITH OTHER PEOPLE: SOMEWHAT DIFFICULT
7. FEELING AFRAID AS IF SOMETHING AWFUL MIGHT HAPPEN: NOT AT ALL
5. BEING SO RESTLESS THAT IT IS HARD TO SIT STILL: NOT AT ALL

## 2021-07-23 NOTE — LETTER
My Asthma Action Plan    Name: Jarad Nolasco   YOB: 2000  Date: 7/23/2021   My doctor: Davidson Perez PA-C   [facility]        My Control Medicine:   My Rescue Medicine:    My Asthma Severity:   Mild Persistent  Know your asthma triggers:                GREEN ZONE   Good Control    I feel good    No cough or wheeze    Can work, sleep and play without asthma symptoms       Take your asthma control medicine every day.     1. If exercise triggers your asthma, take your rescue medication    15 minutes before exercise or sports, and    During exercise if you have asthma symptoms  2. Spacer to use with inhaler: If you have a spacer, make sure to use it with your inhaler             YELLOW ZONE Getting Worse  I have ANY of these:    I do not feel good    Cough or wheeze    Chest feels tight    Wake up at night   1. Keep taking your Green Zone medications  2. Start taking your rescue medicine:    every 20 minutes for up to 1 hour. Then every 4 hours for 24-48 hours.  3. If you stay in the Yellow Zone for more than 12-24 hours, contact your doctor.  4. If you do not return to the Green Zone in 12-24 hours or you get worse, start taking your oral steroid medicine if prescribed by your provider.           RED ZONE Medical Alert - Get Help  I have ANY of these:    I feel awful    Medicine is not helping    Breathing getting harder    Trouble walking or talking    Nose opens wide to breathe       1. Take your rescue medicine NOW  2. If your provider has prescribed an oral steroid medicine, start taking it NOW  3. Call your doctor NOW  4. If you are still in the Red Zone after 20 minutes and you have not reached your doctor:    Take your rescue medicine again and    Call 911 or go to the emergency room right away    See your regular doctor within 2 weeks of an Emergency Room or Urgent Care visit for follow-up treatment.          Annual Reminders:  Meet with Asthma Educator,   Flu Shot in the Fall, consider Pneumonia Vaccination for patients with asthma (aged 19 and older).    Pharmacy: [id number] - [place] - [address] AT [facility] [address]    Electronically signed by Davidson Perez PA-C   Date: 07/23/21                      Asthma Triggers  How To Control Things That Make Your Asthma Worse    Triggers are things that make your asthma worse.  Look at the list below to help you find your triggers and what you can do about them.  You can help prevent asthma flare-ups by staying away from your triggers.      Trigger                                                          What you can do   Cigarette Smoke  Tobacco smoke can make asthma worse. Do not allow smoking in your home, car or around you.  Be sure no one smokes at a child s day care or school.  If you smoke, ask your health care provider for ways to help you quit.  Ask family members to quit too.  Ask your health care provider for a referral to Quit Plan to help you quit smoking, or call 8-275-135-PLAN.     Colds, Flu, Bronchitis  These are common triggers of asthma. Wash your hands often.  Don t touch your eyes, nose or mouth.  Get a flu shot every year.     Dust Mites  These are tiny bugs that live in cloth or carpet. They are too small to see. Wash sheets and blankets in hot water every week.   Encase pillows and mattress in dust mite proof covers.  Avoid having carpet if you can. If you have carpet, vacuum weekly.   Use a dust mask and HEPA vacuum.   Pollen and Outdoor Mold  Some people are allergic to trees, grass, or weed pollen, or molds. Try to keep your windows closed.  Limit time out doors when pollen count is high.   Ask you health care provider about taking medicine during allergy season.     Animal Dander  Some people are allergic to skin flakes, urine or saliva from pets with fur or feathers. Keep pets with fur or feathers out of your home.    If you can t keep the pet  outdoors, then keep the pet out of your bedroom.  Keep the bedroom door closed.  Keep pets off cloth furniture and away from stuffed toys.     Mice, Rats, and Cockroaches   Some people are allergic to the waste from these pests.   Cover food and garbage.  Clean up spills and food crumbs.  Store grease in the refrigerator.   Keep food out of the bedroom.   Indoor Mold  This can be a trigger if your home has high moisture. Fix leaking faucets, pipes, or other sources of water.   Clean moldy surfaces.  Dehumidify basement if it is damp and smelly.   Smoke, Strong Odors, and Sprays  These can reduce air quality. Stay away from strong odors and sprays, such as perfume, powder, hair spray, paints, smoke incense, paint, cleaning products, candles and new carpet.   Exercise or Sports  Some people with asthma have this trigger. Be active!  Ask your doctor about taking medicine before sports or exercise to prevent symptoms.    Warm up for 5-10 minutes before and after sports or exercise.     Other Triggers of Asthma  Cold air:  Cover your nose and mouth with a scarf.  Sometimes laughing or crying can be a trigger.  Some medicines and food can trigger asthma.

## 2021-07-23 NOTE — PROGRESS NOTES
Jarad is a 20 year old who is being evaluated via a billable video visit.      How would you like to obtain your AVS? MyChart  If the video visit is dropped, the invitation should be resent by: Text to cell phone: [phone]  Will anyone else be joining your video visit? No    Video Start Time: 9:07 AM        Subjective   Jarad is a 20 year old who presents for the following health issues     HPI     Asthma Follow-Up  - would like allergy testing  Has an older brother with an allergy to peanuts.   He had a rxn to a peanut m and m (angioedema / hives). Was given an epipen rx in an area ED but never allergist.   Was ACT completed today?    Yes    ACT Total Scores 7/23/2021   ACT TOTAL SCORE (Goal Greater than or Equal to 20) 21   In the past 12 months, how many times did you visit the emergency room for your asthma without being admitted to the hospital? 0   In the past 12 months, how many times were you hospitalized overnight because of your asthma? 0          How many days per week do you miss taking your asthma controller medication?  I do not have an asthma controller medication    Please describe any recent triggers for your asthma: upper respiratory infections    Have you had any Emergency Room Visits, Urgent Care Visits, or Hospital Admissions since your last office visit?  No    Anxiety Follow-Up    How are you doing with your anxiety since your last visit? Improved but starting to get some anxious thoughts again    Are you having other symptoms that might be associated with anxiety? No    Have you had a significant life event? No     Are you feeling depressed? No    Do you have any concerns with your use of alcohol or other drugs? No  Was seeing sushila . 5-6 panic attacks. Had to come home early from college.   No depression. No anhedonia.   Social History     Tobacco Use     Smoking status: Never Smoker     Smokeless tobacco: Never Used   Vaping Use     Vaping Use: Former   Substance Use Topics      Alcohol use: No     Drug use: No     FLORINDA-7 SCORE 11/20/2018 4/5/2019 7/23/2021   Total Score 10 16 6     PHQ 11/20/2018 4/5/2019 7/23/2021   PHQ-9 Total Score 10 11 1   Q9: Thoughts of better off dead/self-harm past 2 weeks Not at all Several days Not at all   F/U: Thoughts of suicide or self-harm - No -   F/U: Safety concerns - No -     Last PHQ-9 7/23/2021   1.  Little interest or pleasure in doing things 0   2.  Feeling down, depressed, or hopeless 0   3.  Trouble falling or staying asleep, or sleeping too much 0   4.  Feeling tired or having little energy 1   5.  Poor appetite or overeating 0   6.  Feeling bad about yourself 0   7.  Trouble concentrating 0   8.  Moving slowly or restless 0   Q9: Thoughts of better off dead/self-harm past 2 weeks 0   PHQ-9 Total Score 1   Difficulty at work, home, or with people Not difficult at all   In the past two weeks have you had thoughts of suicide or self harm? -   Do you have concerns about your personal safety or the safety of others? -     FLORINDA-7  7/23/2021   1. Feeling nervous, anxious, or on edge 2   2. Not being able to stop or control worrying 1   3. Worrying too much about different things 2   4. Trouble relaxing 0   5. Being so restless that it is hard to sit still 0   6. Becoming easily annoyed or irritable 1   7. Feeling afraid, as if something awful might happen 0   FLORINDA-7 Total Score 6   If you checked any problems, how difficult have they made it for you to do your work, take care of things at home, or get along with other people? Somewhat difficult         How many servings of fruits and vegetables do you eat daily?  0-1    On average, how many sweetened beverages do you drink each day (Examples: soda, juice, sweet tea, etc.  Do NOT count diet or artificially sweetened beverages)?   1    How many days per week do you exercise enough to make your heart beat faster? 7    How many minutes a day do you exercise enough to make your heart beat faster? 30 - 60  How  many days per week do you miss taking your medication? 1-2    What makes it hard for you to take your medications?  remembering to take      Review of Systems   Constitutional, HEENT, cardiovascular, pulmonary, GI, , musculoskeletal, neuro, skin, endocrine and psych systems are negative, except as otherwise noted.      Objective           Vitals:  No vitals were obtained today due to virtual visit.    Physical Exam   GENERAL: Healthy, alert and no distress  EYES: Eyes grossly normal to inspection.  No discharge or erythema, or obvious scleral/conjunctival abnormalities.  RESP: No audible wheeze, cough, or visible cyanosis.  No visible retractions or increased work of breathing.    SKIN: Visible skin clear. No significant rash, abnormal pigmentation or lesions.  NEURO: Cranial nerves grossly intact.  Mentation and speech appropriate for age.  PSYCH: Mentation appears normal, affect normal/bright, judgement and insight intact, normal speech and appearance well-groomed.    Jarad was seen today for asthma, allergies and anxiety.    Diagnoses and all orders for this visit:    Peanut allergy  -     Adult Allergy/Asthma Referral; Future    Mild persistent asthma without complication  -     Adult Allergy/Asthma Referral; Future    FLORINDA (generalized anxiety disorder)  -     FLUoxetine (PROZAC) 10 MG capsule; Take 1 capsule daily for 1 week, then increase to 2 caps daily thereafter.    Other obsessive-compulsive disorders  -     FLUoxetine (PROZAC) 10 MG capsule; Take 1 capsule daily for 1 week, then increase to 2 caps daily thereafter.    Other orders  -     REVIEW OF HEALTH MAINTENANCE PROTOCOL ORDERS      work on lifestyle modification  Recheck in 4-5 wks.          Video-Visit Details    Type of service:  Video Visit    Video End Time:9:34 AM    Originating Location (pt. Location): Home    Distant Location (provider location):  [facility] [place]     Platform used for Video Visit: Patricio

## 2021-07-24 ASSESSMENT — ASTHMA QUESTIONNAIRES: ACT_TOTALSCORE: 21

## 2021-07-24 ASSESSMENT — ANXIETY QUESTIONNAIRES: GAD7 TOTAL SCORE: 6

## 2021-07-25 ENCOUNTER — HEALTH MAINTENANCE LETTER (OUTPATIENT)
Age: 21
End: 2021-07-25

## 2021-08-02 ENCOUNTER — TELEPHONE (OUTPATIENT)
Dept: FAMILY MEDICINE | Facility: CLINIC | Age: 21
End: 2021-08-02

## 2021-08-02 NOTE — TELEPHONE ENCOUNTER
Mom calling on behalf of patient. Requesting a referral to [facility] to see psychologist for medication follow up for insurance coverage.

## 2021-08-05 DIAGNOSIS — Z91.010 PEANUT ALLERGY: ICD-10-CM

## 2021-08-05 DIAGNOSIS — J45.30 MILD PERSISTENT ASTHMA WITHOUT COMPLICATION: ICD-10-CM

## 2021-08-08 RX ORDER — EPINEPHRINE 0.3 MG/.3ML
INJECTION SUBCUTANEOUS
Qty: 2 EACH | Refills: 0 | Status: SHIPPED | OUTPATIENT
Start: 2021-08-08 | End: 2022-08-10

## 2021-08-08 RX ORDER — ALBUTEROL SULFATE 90 UG/1
AEROSOL, METERED RESPIRATORY (INHALATION)
Qty: 8.5 G | Refills: 0 | Status: SHIPPED | OUTPATIENT
Start: 2021-08-08 | End: 2024-05-29

## 2021-09-19 ENCOUNTER — HEALTH MAINTENANCE LETTER (OUTPATIENT)
Age: 21
End: 2021-09-19

## 2021-12-24 ENCOUNTER — VIRTUAL VISIT (OUTPATIENT)
Dept: FAMILY MEDICINE | Facility: CLINIC | Age: 21
End: 2021-12-24
Payer: COMMERCIAL

## 2021-12-24 DIAGNOSIS — F41.1 GAD (GENERALIZED ANXIETY DISORDER): Primary | ICD-10-CM

## 2021-12-24 DIAGNOSIS — F42.8 OTHER OBSESSIVE-COMPULSIVE DISORDERS: ICD-10-CM

## 2021-12-24 DIAGNOSIS — F33.1 MODERATE EPISODE OF RECURRENT MAJOR DEPRESSIVE DISORDER (H): ICD-10-CM

## 2021-12-24 DIAGNOSIS — F90.0 ATTENTION DEFICIT HYPERACTIVITY DISORDER (ADHD), PREDOMINANTLY INATTENTIVE TYPE: ICD-10-CM

## 2021-12-24 PROCEDURE — 99214 OFFICE O/P EST MOD 30 MIN: CPT | Mod: TEL | Performed by: PHYSICIAN ASSISTANT

## 2021-12-24 PROCEDURE — 96127 BRIEF EMOTIONAL/BEHAV ASSMT: CPT | Mod: 95 | Performed by: PHYSICIAN ASSISTANT

## 2021-12-24 RX ORDER — VENLAFAXINE HYDROCHLORIDE 75 MG/1
CAPSULE, EXTENDED RELEASE ORAL
COMMUNITY
Start: 2021-11-10 | End: 2021-12-24

## 2021-12-24 RX ORDER — ARIPIPRAZOLE 10 MG/1
10 TABLET ORAL DAILY
Qty: 90 TABLET | Refills: 1 | Status: SHIPPED | OUTPATIENT
Start: 2021-12-24 | End: 2022-06-14

## 2021-12-24 RX ORDER — LISDEXAMFETAMINE DIMESYLATE 50 MG
50 CAPSULE ORAL EVERY MORNING
Qty: 30 CAPSULE | Refills: 0 | Status: SHIPPED | OUTPATIENT
Start: 2021-12-24 | End: 2022-06-14

## 2021-12-24 RX ORDER — VENLAFAXINE HYDROCHLORIDE 150 MG/1
150 CAPSULE, EXTENDED RELEASE ORAL DAILY
Qty: 30 CAPSULE | Refills: 1 | Status: SHIPPED | OUTPATIENT
Start: 2021-12-24 | End: 2022-06-17

## 2021-12-24 ASSESSMENT — ANXIETY QUESTIONNAIRES
7. FEELING AFRAID AS IF SOMETHING AWFUL MIGHT HAPPEN: SEVERAL DAYS
6. BECOMING EASILY ANNOYED OR IRRITABLE: MORE THAN HALF THE DAYS
5. BEING SO RESTLESS THAT IT IS HARD TO SIT STILL: NOT AT ALL
1. FEELING NERVOUS, ANXIOUS, OR ON EDGE: MORE THAN HALF THE DAYS
2. NOT BEING ABLE TO STOP OR CONTROL WORRYING: SEVERAL DAYS
GAD7 TOTAL SCORE: 7
IF YOU CHECKED OFF ANY PROBLEMS ON THIS QUESTIONNAIRE, HOW DIFFICULT HAVE THESE PROBLEMS MADE IT FOR YOU TO DO YOUR WORK, TAKE CARE OF THINGS AT HOME, OR GET ALONG WITH OTHER PEOPLE: SOMEWHAT DIFFICULT
3. WORRYING TOO MUCH ABOUT DIFFERENT THINGS: SEVERAL DAYS

## 2021-12-24 ASSESSMENT — PATIENT HEALTH QUESTIONNAIRE - PHQ9
SUM OF ALL RESPONSES TO PHQ QUESTIONS 1-9: 3
5. POOR APPETITE OR OVEREATING: NOT AT ALL

## 2021-12-24 NOTE — PROGRESS NOTES
"Jarad is a 21 year old who is being evaluated via a billable telephone visit.      What phone number would you like to be contacted at? [phone]  How would you like to obtain your AVS? Oliver Garsia   Jarad is a 21 year old who presents for the following health issues     HPI     Depression and Anxiety Follow-Up    How are you doing with your depression since your last visit? Improved     How are you doing with your anxiety since your last visit?  Improved     Are you having other symptoms that might be associated with depression or anxiety? Yes:  \"cranky or oliver\"    Have you had a significant life event? No     Do you have any concerns with your use of alcohol or other drugs? No  Had gene sight testing fluoxetine was on the yellow list. So went with effexor instead. Has helped some. Still some irritability and anxiety. No depression/anhedonia. Sleeping well.   adhd doing well on vyvanse.    Social History     Tobacco Use     Smoking status: Never Smoker     Smokeless tobacco: Never Used   Vaping Use     Vaping Use: Former   Substance Use Topics     Alcohol use: No     Drug use: No     PHQ 4/5/2019 7/23/2021 12/24/2021   PHQ-9 Total Score 11 1 3   Q9: Thoughts of better off dead/self-harm past 2 weeks Several days Not at all Not at all   F/U: Thoughts of suicide or self-harm No - -   F/U: Safety concerns No - -     FLORINDA-7 SCORE 4/5/2019 7/23/2021 12/24/2021   Total Score 16 6 7     Last PHQ-9 12/24/2021   1.  Little interest or pleasure in doing things 1   2.  Feeling down, depressed, or hopeless 0   3.  Trouble falling or staying asleep, or sleeping too much 0   4.  Feeling tired or having little energy 0   5.  Poor appetite or overeating 2   6.  Feeling bad about yourself 0   7.  Trouble concentrating 0   8.  Moving slowly or restless 0   Q9: Thoughts of better off dead/self-harm past 2 weeks 0   PHQ-9 Total Score 3   Difficulty at work, home, or with people Somewhat difficult   In the past " two weeks have you had thoughts of suicide or self harm? -   Do you have concerns about your personal safety or the safety of others? -     FLORINDA-7  12/24/2021   1. Feeling nervous, anxious, or on edge 2   2. Not being able to stop or control worrying 1   3. Worrying too much about different things 1   4. Trouble relaxing 0   5. Being so restless that it is hard to sit still 0   6. Becoming easily annoyed or irritable 2   7. Feeling afraid, as if something awful might happen 1   FLORINDA-7 Total Score 7   If you checked any problems, how difficult have they made it for you to do your work, take care of things at home, or get along with other people? Somewhat difficult       Suicide Assessment Five-step Evaluation and Treatment (SAFE-T)          Review of Systems   Constitutional, HEENT, cardiovascular, pulmonary, GI, , musculoskeletal, neuro, skin, endocrine and psych systems are negative, except as otherwise noted.      Objective           Vitals:  No vitals were obtained today due to virtual visit.    Physical Exam   healthy, alert and no distress  PSYCH: Alert and oriented times 3; coherent speech, normal   rate and volume, able to articulate logical thoughts, able   to abstract reason, no tangential thoughts, no hallucinations   or delusions  His affect is normal  RESP: No cough, no audible wheezing, able to talk in full sentences  Remainder of exam unable to be completed due to telephone visits    Diagnoses and all orders for this visit:    FLORINDA (generalized anxiety disorder)  -     venlafaxine (EFFEXOR-XR) 150 MG 24 hr capsule; Take 1 capsule (150 mg) by mouth daily    Attention deficit hyperactivity disorder (ADHD), predominantly inattentive type  -     VYVANSE 50 MG capsule; Take 1 capsule (50 mg) by mouth every morning    Other obsessive-compulsive disorders  -     ARIPiprazole (ABILIFY) 10 MG tablet; Take 1 tablet (10 mg) by mouth daily    Moderate episode of recurrent major depressive disorder  (H)      Increased effexor from  mg every day.  work on lifestyle modification  Recheck in 6-8 wks        Phone call duration: 11 minutes

## 2021-12-25 ASSESSMENT — ANXIETY QUESTIONNAIRES: GAD7 TOTAL SCORE: 7

## 2022-05-26 ENCOUNTER — NURSE TRIAGE (OUTPATIENT)
Dept: FAMILY MEDICINE | Facility: CLINIC | Age: 22
End: 2022-05-26
Payer: COMMERCIAL

## 2022-05-26 NOTE — TELEPHONE ENCOUNTER
Spoke with patient and gave instructions to go to ER.  He voiced understanding and agreement. He will go now.  He reports he can drive himself.  Annette Cam RN  [facility]

## 2022-06-01 NOTE — PROGRESS NOTES
{PROVIDER CHARTING PREFERENCE:016444}    Sun Abraham is a 21 year old who presents for the following health issues {ACCOMPANIED BY STATEMENT (Optional):580250}    HPI     {SUPERLIST (Optional):996184}  {additonal problems for provider to add (Optional):981253}    Review of Systems   {ROS COMP (Optional):836946}      Objective    There were no vitals taken for this visit.  There is no height or weight on file to calculate BMI.  Physical Exam   {Exam List (Optional):051203}    {Diagnostic Test Results (Optional):190625}    {AMBULATORY ATTESTATION (Optional):315524}

## 2022-06-14 ENCOUNTER — OFFICE VISIT (OUTPATIENT)
Dept: FAMILY MEDICINE | Facility: CLINIC | Age: 22
End: 2022-06-14
Payer: COMMERCIAL

## 2022-06-14 VITALS
DIASTOLIC BLOOD PRESSURE: 96 MMHG | RESPIRATION RATE: 20 BRPM | BODY MASS INDEX: 36.4 KG/M2 | HEART RATE: 76 BPM | OXYGEN SATURATION: 98 % | SYSTOLIC BLOOD PRESSURE: 128 MMHG | TEMPERATURE: 97.4 F | WEIGHT: 245.8 LBS | HEIGHT: 69 IN

## 2022-06-14 DIAGNOSIS — F41.1 GAD (GENERALIZED ANXIETY DISORDER): ICD-10-CM

## 2022-06-14 DIAGNOSIS — R03.0 ELEVATED BP WITHOUT DIAGNOSIS OF HYPERTENSION: ICD-10-CM

## 2022-06-14 DIAGNOSIS — F42.8 OTHER OBSESSIVE-COMPULSIVE DISORDERS: ICD-10-CM

## 2022-06-14 DIAGNOSIS — F33.1 MODERATE EPISODE OF RECURRENT MAJOR DEPRESSIVE DISORDER (H): ICD-10-CM

## 2022-06-14 DIAGNOSIS — F90.0 ATTENTION DEFICIT HYPERACTIVITY DISORDER (ADHD), PREDOMINANTLY INATTENTIVE TYPE: ICD-10-CM

## 2022-06-14 DIAGNOSIS — Z13.1 SCREENING FOR DIABETES MELLITUS: ICD-10-CM

## 2022-06-14 DIAGNOSIS — Z00.00 ROUTINE GENERAL MEDICAL EXAMINATION AT A HEALTH CARE FACILITY: Primary | ICD-10-CM

## 2022-06-14 DIAGNOSIS — Z13.220 LIPID SCREENING: ICD-10-CM

## 2022-06-14 LAB
ANION GAP SERPL CALCULATED.3IONS-SCNC: 3 MMOL/L (ref 3–14)
BUN SERPL-MCNC: 10 MG/DL (ref 7–30)
CALCIUM SERPL-MCNC: 9.8 MG/DL (ref 8.5–10.1)
CHLORIDE BLD-SCNC: 108 MMOL/L (ref 94–109)
CHOLEST SERPL-MCNC: 166 MG/DL
CO2 SERPL-SCNC: 29 MMOL/L (ref 20–32)
CREAT SERPL-MCNC: 0.74 MG/DL (ref 0.66–1.25)
FASTING STATUS PATIENT QL REPORTED: YES
GFR SERPL CREATININE-BSD FRML MDRD: >90 ML/MIN/1.73M2
GLUCOSE BLD-MCNC: 97 MG/DL (ref 70–99)
HDLC SERPL-MCNC: 54 MG/DL
LDLC SERPL CALC-MCNC: 103 MG/DL
NONHDLC SERPL-MCNC: 112 MG/DL
POTASSIUM BLD-SCNC: 4.3 MMOL/L (ref 3.4–5.3)
SODIUM SERPL-SCNC: 140 MMOL/L (ref 133–144)
TRIGL SERPL-MCNC: 47 MG/DL
TSH SERPL DL<=0.005 MIU/L-ACNC: 0.49 MU/L (ref 0.4–4)

## 2022-06-14 PROCEDURE — 99214 OFFICE O/P EST MOD 30 MIN: CPT | Mod: 25 | Performed by: PHYSICIAN ASSISTANT

## 2022-06-14 PROCEDURE — 84443 ASSAY THYROID STIM HORMONE: CPT | Performed by: PHYSICIAN ASSISTANT

## 2022-06-14 PROCEDURE — 80061 LIPID PANEL: CPT | Performed by: PHYSICIAN ASSISTANT

## 2022-06-14 PROCEDURE — 80048 BASIC METABOLIC PNL TOTAL CA: CPT | Performed by: PHYSICIAN ASSISTANT

## 2022-06-14 PROCEDURE — 36415 COLL VENOUS BLD VENIPUNCTURE: CPT | Performed by: PHYSICIAN ASSISTANT

## 2022-06-14 PROCEDURE — 96127 BRIEF EMOTIONAL/BEHAV ASSMT: CPT | Performed by: PHYSICIAN ASSISTANT

## 2022-06-14 PROCEDURE — 99395 PREV VISIT EST AGE 18-39: CPT | Performed by: PHYSICIAN ASSISTANT

## 2022-06-14 RX ORDER — ARIPIPRAZOLE 10 MG/1
10 TABLET ORAL DAILY
Qty: 90 TABLET | Refills: 1 | Status: SHIPPED | OUTPATIENT
Start: 2022-06-14 | End: 2022-07-15

## 2022-06-14 ASSESSMENT — ENCOUNTER SYMPTOMS
HEARTBURN: 0
DYSURIA: 0
HEMATOCHEZIA: 0
PALPITATIONS: 0
HEADACHES: 0
CONSTIPATION: 0
SHORTNESS OF BREATH: 0
NERVOUS/ANXIOUS: 0
PARESTHESIAS: 0
MYALGIAS: 0
CHILLS: 0
ABDOMINAL PAIN: 0
FEVER: 0
NAUSEA: 0
JOINT SWELLING: 0
FREQUENCY: 0
COUGH: 0
HEMATURIA: 0
DIZZINESS: 0
DIARRHEA: 0
WEAKNESS: 0
SORE THROAT: 0
EYE PAIN: 0
ARTHRALGIAS: 0

## 2022-06-14 ASSESSMENT — PATIENT HEALTH QUESTIONNAIRE - PHQ9
SUM OF ALL RESPONSES TO PHQ QUESTIONS 1-9: 6
SUM OF ALL RESPONSES TO PHQ QUESTIONS 1-9: 6
10. IF YOU CHECKED OFF ANY PROBLEMS, HOW DIFFICULT HAVE THESE PROBLEMS MADE IT FOR YOU TO DO YOUR WORK, TAKE CARE OF THINGS AT HOME, OR GET ALONG WITH OTHER PEOPLE: SOMEWHAT DIFFICULT

## 2022-06-14 ASSESSMENT — PAIN SCALES - GENERAL: PAINLEVEL: NO PAIN (0)

## 2022-06-14 NOTE — PROGRESS NOTES
SUBJECTIVE:   CC: Jarad Nolasco is an 21 year old male who presents for preventative health visit.       Patient has been advised of split billing requirements and indicates understanding: Yes  Healthy Habits:     Getting at least 3 servings of Calcium per day:  Yes    Bi-annual eye exam:  NO    Dental care twice a year:  Yes    Sleep apnea or symptoms of sleep apnea:  None    Diet:  Regular (no restrictions)    Frequency of exercise:  2-3 days/week    Duration of exercise:  15-30 minutes    Taking medications regularly:  Yes    Medication side effects:  Not applicable    PHQ-2 Total Score: 3    Additional concerns today:  Yes      Recheck of his anxiety and depression. Denies depression, but anxiety has been more of an issue, especially after starting a new job. No anhedonia. Sleeping well on trazodone. OCD has been well controlled. No compulsion issues at this time.   Blood pressure has been a little elevated as of late.    Today's PHQ-2 Score:   PHQ-2 ( 1999 [employer]) 6/14/2022   Q1: Little interest or pleasure in doing things 1   Q2: Feeling down, depressed or hopeless 2   PHQ-2 Score 3   Q1: Little interest or pleasure in doing things Several days   Q2: Feeling down, depressed or hopeless More than half the days   PHQ-2 Score 3       Abuse: Current or Past(Physical, Sexual or Emotional)- No  Do you feel safe in your environment? Yes    Have you ever done Advance Care Planning? (For example, a Health Directive, POLST, or a discussion with a medical provider or your loved ones about your wishes): No, advance care planning information given to patient to review.  Patient declined advance care planning discussion at this time.    Social History     Tobacco Use     Smoking status: Former Smoker     Packs/day: 0.00     Years: 0.00     Pack years: 0.00     Smokeless tobacco: Never Used   Substance Use Topics     Alcohol use: Yes     Comment: social         Alcohol Use 6/14/2022   Prescreen: >3 drinks/day or >7  drinks/week? No       Last PSA: No results found for: PSA    Reviewed orders with patient. Reviewed health maintenance and updated orders accordingly - Yes  Lab work is in process  Labs reviewed in EPIC  BP Readings from Last 3 Encounters:   06/14/22 (!) 128/96   06/20/19 132/83   04/05/19 126/82    Wt Readings from Last 3 Encounters:   06/14/22 111.5 kg (245 lb 12.8 oz)   06/20/19 109.3 kg (241 lb) (99 %, Z= 2.26)*   04/05/19 109.1 kg (240 lb 9.6 oz) (99 %, Z= 2.26)*     * Growth percentiles are based on [facility] (Boys, 2-20 Years) data.                  Patient Active Problem List   Diagnosis     Mild persistent asthma     Peanut allergy     FLORNIDA (generalized anxiety disorder)     Attention deficit disorder, unspecified hyperactivity presence     Primary insomnia     Moderate episode of recurrent major depressive disorder (H)     History reviewed. No pertinent surgical history.    Social History     Tobacco Use     Smoking status: Former Smoker     Packs/day: 0.00     Years: 0.00     Pack years: 0.00     Smokeless tobacco: Never Used   Substance Use Topics     Alcohol use: Yes     Comment: social     Family History   Problem Relation Age of Onset     Breast Cancer Maternal Grandmother      Heart Disease Maternal Grandfather         Pacemaker     Heart Disease Paternal Grandfather          Current Outpatient Medications   Medication Sig Dispense Refill     albuterol (PROAIR HFA/PROVENTIL HFA/VENTOLIN HFA) 108 (90 Base) MCG/ACT inhaler INHALE 1 TO 2 PUFFS INTO THE LUNGS EVERY 4 HOURS AS NEEDED FOR SHORTNESS OF BREATH OR DIFFICULT BREATHING OR WHEEZING 8.5 g 0     ARIPiprazole (ABILIFY) 10 MG tablet Take 1 tablet (10 mg) by mouth daily 90 tablet 1     cloNIDine (CATAPRES) 0.2 MG tablet TK 1 T PO HS       EPINEPHrine (ANY BX GENERIC EQUIV) 0.3 MG/0.3ML injection 2-pack INJECT INTRAMUSCULARLY ONCE AS DIRECTED AS NEEDED FOR ANAPHYLAXIS 2 each 0     traZODone (DESYREL) 50 MG tablet Take 1 tablet (50 mg) by mouth At Bedtime 90  tablet 3     venlafaxine (EFFEXOR-XR) 150 MG 24 hr capsule Take 1 capsule (150 mg) by mouth daily 30 capsule 1     VYVANSE 50 MG capsule Take 1 capsule (50 mg) by mouth every morning 30 capsule 0     Allergies   Allergen Reactions     Peanuts [Nuts] Anaphylaxis, Nausea and Vomiting and Swelling     Recent Labs   Lab Test 05/01/18  0901   LDL 97   HDL 54   TRIG 112*        Reviewed and updated as needed this visit by clinical staff   Tobacco  Allergies  Meds   Med Hx  Surg Hx  Fam Hx  Soc Hx          Reviewed and updated as needed this visit by Provider                   History reviewed. No pertinent past medical history.   History reviewed. No pertinent surgical history.    Review of Systems   Constitutional: Negative for chills and fever.   HENT: Negative for congestion, ear pain, hearing loss and sore throat.    Eyes: Negative for pain and visual disturbance.   Respiratory: Negative for cough and shortness of breath.    Cardiovascular: Negative for chest pain, palpitations and peripheral edema.   Gastrointestinal: Negative for abdominal pain, constipation, diarrhea, heartburn, hematochezia and nausea.   Genitourinary: Negative for dysuria, frequency, genital sores, hematuria, impotence, penile discharge and urgency.   Musculoskeletal: Negative for arthralgias, joint swelling and myalgias.   Skin: Negative for rash.   Neurological: Negative for dizziness, weakness, headaches and paresthesias.   Psychiatric/Behavioral: Negative for mood changes. The patient is not nervous/anxious.      CONSTITUTIONAL: NEGATIVE for fever, chills, change in weight  INTEGUMENTARY/SKIN: NEGATIVE for worrisome rashes, moles or lesions  EYES: NEGATIVE for vision changes or irritation  ENT: NEGATIVE for ear, mouth and throat problems  RESP: NEGATIVE for significant cough or SOB  CV: NEGATIVE for chest pain, palpitations or peripheral edema  GI: NEGATIVE for nausea, abdominal pain, heartburn, or change in bowel habits   male:  "negative for dysuria, hematuria, decreased urinary stream, erectile dysfunction, urethral discharge  MUSCULOSKELETAL: NEGATIVE for significant arthralgias or myalgia  NEURO: NEGATIVE for weakness, dizziness or paresthesias  PSYCHIATRIC: NEGATIVE for changes in mood or affect    OBJECTIVE:   BP (!) 128/96   Pulse 76   Temp 97.4  F (36.3  C) (Tympanic)   Resp 20   Ht 1.74 m (5' 8.5\")   Wt 111.5 kg (245 lb 12.8 oz)   SpO2 98%   BMI 36.83 kg/m      Physical Exam  GENERAL: healthy, alert and no distress  EYES: Eyes grossly normal to inspection, PERRL and conjunctivae and sclerae normal  HENT: ear canals and TM's normal, nose and mouth without ulcers or lesions  NECK: no adenopathy, no asymmetry, masses, or scars and thyroid normal to palpation  RESP: lungs clear to auscultation - no rales, rhonchi or wheezes  CV: regular rate and rhythm, normal S1 S2, no S3 or S4, no murmur, click or rub, no peripheral edema and peripheral pulses strong  ABDOMEN: soft, nontender, no hepatosplenomegaly, no masses and bowel sounds normal  MS: no gross musculoskeletal defects noted, no edema  SKIN: no suspicious lesions or rashes  NEURO: Normal strength and tone, mentation intact and speech normal  PSYCH: mentation appears normal, affect normal/bright    Diagnostic Test Results:  Labs reviewed in Epic    ASSESSMENT/PLAN:       ICD-10-CM    1. Routine general medical examination at a health care facility  Z00.00    2. Other obsessive-compulsive disorders  F42.8 ARIPiprazole (ABILIFY) 10 MG tablet     Adult Mental Health  Referral   3. Moderate episode of recurrent major depressive disorder (H)  F33.1 Adult Mental Health  Referral   4. FLORINDA (generalized anxiety disorder)  F41.1 Adult Mental Health  Referral   5. Attention deficit hyperactivity disorder (ADHD), predominantly inattentive type  F90.0    6. Screening for diabetes mellitus  Z13.1    7. Lipid screening  Z13.220 Lipid panel reflex to direct LDL " "Fasting   8. Elevated BP without diagnosis of hypertension  R03.0 Basic metabolic panel  (Ca, Cl, CO2, Creat, Gluc, K, Na, BUN)     TSH with free T4 reflex     Continue current meds.   Discussed low salt diet and consistent exercise to aid in losing weight and hopefully lower his blood pressure   Patient has been advised of split billing requirements and indicates understanding: Yes    COUNSELING:   Reviewed preventive health counseling, as reflected in patient instructions       Regular exercise       Healthy diet/nutrition    Estimated body mass index is 36.83 kg/m  as calculated from the following:    Height as of this encounter: 1.74 m (5' 8.5\").    Weight as of this encounter: 111.5 kg (245 lb 12.8 oz).     Weight management plan: Discussed healthy diet and exercise guidelines    He reports that he has quit smoking. He smoked 0.00 packs per day for 0.00 years. He has never used smokeless tobacco.      Counseling Resources:  ATP IV Guidelines  Pooled Cohorts Equation Calculator  FRAX Risk Assessment  ICSI Preventive Guidelines  Dietary Guidelines for Americans, 2010  USDA's MyPlate  ASA Prophylaxis  Lung CA Screening    Davidson Perez PA-C  [facility] [place]  Answers for HPI/ROS submitted by the patient on 6/14/2022  If you checked off any problems, how difficult have these problems made it for you to do your work, take care of things at home, or get along with other people?: Somewhat difficult  PHQ9 TOTAL SCORE: 6      "

## 2022-06-16 ASSESSMENT — ASTHMA QUESTIONNAIRES: ACT_TOTALSCORE: 21

## 2022-06-17 ENCOUNTER — TELEPHONE (OUTPATIENT)
Dept: PSYCHIATRY | Facility: CLINIC | Age: 22
End: 2022-06-17

## 2022-06-17 ENCOUNTER — VIRTUAL VISIT (OUTPATIENT)
Dept: BEHAVIORAL HEALTH | Facility: CLINIC | Age: 22
End: 2022-06-17
Payer: COMMERCIAL

## 2022-06-17 ENCOUNTER — VIRTUAL VISIT (OUTPATIENT)
Dept: PSYCHIATRY | Facility: CLINIC | Age: 22
End: 2022-06-17
Attending: PHYSICIAN ASSISTANT
Payer: COMMERCIAL

## 2022-06-17 DIAGNOSIS — F42.8 OTHER OBSESSIVE-COMPULSIVE DISORDERS: ICD-10-CM

## 2022-06-17 DIAGNOSIS — F33.1 MODERATE EPISODE OF RECURRENT MAJOR DEPRESSIVE DISORDER (H): ICD-10-CM

## 2022-06-17 DIAGNOSIS — F41.1 GAD (GENERALIZED ANXIETY DISORDER): ICD-10-CM

## 2022-06-17 DIAGNOSIS — F41.1 GENERALIZED ANXIETY DISORDER: Primary | ICD-10-CM

## 2022-06-17 PROCEDURE — 90791 PSYCH DIAGNOSTIC EVALUATION: CPT | Mod: GT | Performed by: SOCIAL WORKER

## 2022-06-17 PROCEDURE — 99205 OFFICE O/P NEW HI 60 MIN: CPT | Mod: GT | Performed by: NURSE PRACTITIONER

## 2022-06-17 RX ORDER — VENLAFAXINE HYDROCHLORIDE 150 MG/1
150 CAPSULE, EXTENDED RELEASE ORAL DAILY
Qty: 30 CAPSULE | Refills: 0 | Status: SHIPPED | OUTPATIENT
Start: 2022-06-17 | End: 2022-08-19

## 2022-06-17 ASSESSMENT — ANXIETY QUESTIONNAIRES
6. BECOMING EASILY ANNOYED OR IRRITABLE: MORE THAN HALF THE DAYS
7. FEELING AFRAID AS IF SOMETHING AWFUL MIGHT HAPPEN: MORE THAN HALF THE DAYS
GAD7 TOTAL SCORE: 15
5. BEING SO RESTLESS THAT IT IS HARD TO SIT STILL: SEVERAL DAYS
3. WORRYING TOO MUCH ABOUT DIFFERENT THINGS: MORE THAN HALF THE DAYS
1. FEELING NERVOUS, ANXIOUS, OR ON EDGE: NEARLY EVERY DAY
GAD7 TOTAL SCORE: 15
2. NOT BEING ABLE TO STOP OR CONTROL WORRYING: NEARLY EVERY DAY
GAD7 TOTAL SCORE: 15
4. TROUBLE RELAXING: MORE THAN HALF THE DAYS
8. IF YOU CHECKED OFF ANY PROBLEMS, HOW DIFFICULT HAVE THESE MADE IT FOR YOU TO DO YOUR WORK, TAKE CARE OF THINGS AT HOME, OR GET ALONG WITH OTHER PEOPLE?: VERY DIFFICULT
7. FEELING AFRAID AS IF SOMETHING AWFUL MIGHT HAPPEN: MORE THAN HALF THE DAYS

## 2022-06-17 ASSESSMENT — COLUMBIA-SUICIDE SEVERITY RATING SCALE - C-SSRS
6. HAVE YOU EVER DONE ANYTHING, STARTED TO DO ANYTHING, OR PREPARED TO DO ANYTHING TO END YOUR LIFE?: NO
2. HAVE YOU ACTUALLY HAD ANY THOUGHTS OF KILLING YOURSELF?: NO
TOTAL  NUMBER OF ABORTED OR SELF INTERRUPTED ATTEMPTS LIFETIME: NO
TOTAL  NUMBER OF INTERRUPTED ATTEMPTS LIFETIME: NO
1. HAVE YOU WISHED YOU WERE DEAD OR WISHED YOU COULD GO TO SLEEP AND NOT WAKE UP?: NO
ATTEMPT LIFETIME: NO

## 2022-06-17 NOTE — ASSESSMENT & PLAN NOTE
70 lb weight gain with the Abilify however, it has been effective.  Will continue this dose as it was just restarted about 4 days ago (off for three months due to insurance issues).  Will look at transitioning to Rexulti as it has a decreased side effect profile and chemically related to the Abilify.  Order placed for 1 mg Rexulti for one week then increase to 2 mg to evaluate if a prior authorization is necessary and the cost of the medication.  Continue the Venlafaxine XR at 150 mg and trazodone 50 mg at bedtime (no scripts needed).  Follow-up 4 weeks.  If Rexulti approved, will taper by taking Abilify 5 mg for one week, Rexulti 1 mg for one week then discontinue the Abilify and increase the Rexulti to 2 mg.

## 2022-06-17 NOTE — PROGRESS NOTES
"    [facility] behavioral health [facility]  Provider Name:  Roselyn Gutierrez [facility]      PATIENT'S NAME: Jarad Nolasco  PREFERRED NAME: Ilia  PRONOUNS:   he/him/his    MRN: [id number]  : 2000  ADDRESS: [address] [address]  ACCT. NUMBER:  [id number]  DATE OF SERVICE: 22  START TIME: 230pm  END TIME: 251pm  PREFERRED PHONE: [phone]  May we leave a program related message: Yes  SERVICE MODALITY:  Video Visit:      Provider verified identity through the following two step process.  Patient provided:  Patient     Telemedicine Visit: The patient's condition can be safely assessed and treated via synchronous audio and visual telemedicine encounter.      Reason for Telemedicine Visit: Services only offered telehealth    Originating Site (Patient Location): Patient's home    Distant Site (Provider Location): Provider Remote Setting- Home Office    Consent:  The patient/guardian has verbally consented to: the potential risks and benefits of telemedicine (video visit) versus in person care; bill my insurance or make self-payment for services provided; and responsibility for payment of non-covered services.     Patient would like the video invitation sent by:  My Chart    Mode of Communication:  Video Conference via Amwell    As the provider I attest to compliance with applicable laws and regulations related to telemedicine.    UNIVERSAL ADULT Mental Health DIAGNOSTIC ASSESSMENT    Identifying Information:  Patient is a 21 year old,  .  The pronoun use throughout this assessment reflects the patient's chosen pronoun.  Patient was referred for an assessment by self.  Patient attended the session alone.    Chief Complaint:   The reason for seeking services at this time is: \"Medication/Anxiety\", mood disorder . Dx at [facility] [employer] [facility] with anxiety and ADHD as preschooler, per pt and his mother. He was having panic attacks at a very early age. The problem(s) began " "worsening 5/22/2022.     Patient has attempted to resolve these concerns in the past through medications, has therapist he sees off/on as needed. Reports that he had been seeing a psychiatrist at [facility] but they kept leaving.     Stress: anxiety makes things more stressful than they need to be, life in general, next steps in his life (will be graduating college next year)  Goals: \"figure out medications and have consistent check ins to monitor progress\" \"don't feel as panicky, not have racing thoughts, less irritability\"   Most important: abilify worked well but has caused weight gain but he's not concerned right now    Social/Family History:  Patient reported they grew up in [place], [place].  They were raised by biological parents  .  Parents were always together. Siblings: 3 brothers. Patient reported that their childhood was good, supportive family.  Patient described their current relationships with family of origin as good.     The patient describes their cultural background as .  Cultural influences and impact on patient's life structure, values, norms, and healthcare: [employer].  Contextual influences on patient's health include: Individual Factors anxiety.    These factors will be addressed in the Preliminary Treatment plan. Patient identified their preferred language to be English. Patient reported they does not need the assistance of an  or other support involved in therapy.     Patient reported had no significant delays in developmental tasks.   Patient's highest education level was associate degree / vocational certificate  .  Patient identified the following learning problems: none reported.  Modifications will not be used to assist communication in therapy.  Patient reports they are  able to understand written materials.    Patient reported the following relationship history never .  Patient's current relationship status is single for entire life.   Patient identified their " sexual orientation as heterosexual.  Patient reported having   child(valentin). Patient identified parents; siblings as part of their support system.  Patient identified the quality of these relationships as stable and meaningful,  .      Patient's current living/housing situation involves staying with someone.  The immediate members of family and household include Christianne, 58,Mother  and they report that housing is stable.    Patient is currently employed fulltime at [employer]. Patient reports their finances are obtained through employment; parents. Patient does identify finances as a current stressor.      Patient reported that they have not been involved with the legal system.    . Patient does not report being under probation/ parole/ jurisdiction. They are not under any current court jurisdiction. .    Patient's Strengths and Limitations:  Patient identified the following strengths or resources that will help them succeed in treatment: friends / good social support, family support, insight, intelligence, motivation and strong social skills. Things that may interfere with the patient's success in treatment include: none identified.     Assessments:  The following assessments were completed by patient for this visit:  PHQ2:   PHQ-2 ( 2000 [employer]) 6/14/2022 8/7/2018 5/17/2018   Q1: Little interest or pleasure in doing things 1 0 0   Q2: Feeling down, depressed or hopeless 2 0 0   PHQ-2 Score 3 0 0   Q1: Little interest or pleasure in doing things Several days - -   Q2: Feeling down, depressed or hopeless More than half the days - -   PHQ-2 Score 3 - -     PHQ9:   PHQ-9 SCORE 8/7/2018 11/20/2018 4/5/2019 7/23/2021 12/24/2021 6/14/2022   PHQ-9 Total Score MyChart - - - - - 6 (Mild depression)   PHQ-9 Total Score 7 10 11 1 3 6     GAD7:   FLORINDA-7 SCORE 8/7/2018 11/20/2018 4/5/2019 7/23/2021 12/24/2021 6/17/2022 6/17/2022   Total Score - - - - - - 15 (severe anxiety)   Total Score 7 10 16 6 7 15 15     CAGE-AID:    CAGE-AID Total Score 6/17/2022 6/17/2022 6/17/2022   Total Score 0 0 0   Total Score MyChart - - 0 (A total score of 2 or greater is considered clinically significant)     PROMIS 10-Global Health (all questions and answers displayed):   PROMIS 10 6/17/2022 6/17/2022 6/17/2022   In general, would you say your health is: - - Good   In general, would you say your quality of life is: - - Good   In general, how would you rate your physical health? - - Very good   In general, how would you rate your mental health, including your mood and your ability to think? - - Fair   In general, how would you rate your satisfaction with your social activities and relationships? - - Good   In general, please rate how well you carry out your usual social activities and roles - - Good   To what extent are you able to carry out your everyday physical activities such as walking, climbing stairs, carrying groceries, or moving a chair? - - Mostly   How often have you been bothered by emotional problems such as feeling anxious, depressed or irritable? - - Always   How would you rate your fatigue on average? - - Moderate   How would you rate your pain on average?   0 = No Pain  to  10 = Worst Imaginable Pain - - 6   In general, would you say your health is: 3 3 3   In general, would you say your quality of life is: 3 3 3   In general, how would you rate your physical health? 4 4 4   In general, how would you rate your mental health, including your mood and your ability to think? 2 2 2   In general, how would you rate your satisfaction with your social activities and relationships? 3 3 3   In general, please rate how well you carry out your usual social activities and roles. (This includes activities at home, at work and in your community, and responsibilities as a parent, child, spouse, employee, friend, etc.) 3 3 3   To what extent are you able to carry out your everyday physical activities such as walking, climbing stairs, carrying  groceries, or moving a chair? 4 4 4   In the past 7 days, how often have you been bothered by emotional problems such as feeling anxious, depressed, or irritable? 5 5 5   In the past 7 days, how would you rate your fatigue on average? 3 3 3   In the past 7 days, how would you rate your pain on average, where 0 means no pain, and 10 means worst imaginable pain? 6 6 6   Global Mental Health Score 9 9 9   Global Physical Health Score 14 14 14   PROMIS TOTAL - SUBSCORES 23 23 23   Some recent data might be hidden     PROMIS 10-Global Health (only subscores and total score):   PROMIS-10 Scores Only 6/17/2022 6/17/2022 6/17/2022   Global Mental Health Score 9 9 9   Global Physical Health Score 14 14 14   PROMIS TOTAL - SUBSCORES 23 23 23     [place] Suicide Severity Rating Scale (Lifetime/Recent)  [place] Suicide Severity Rating (Lifetime/Recent) 6/17/2022   1. Wish to be Dead (Lifetime) 0   2. Non-Specific Active Suicidal Thoughts (Lifetime) 0   Actual Attempt (Lifetime) 0   Has subject engaged in non-suicidal self-injurious behavior? (Lifetime) 0   Interrupted Attempts (Lifetime) 0   Aborted or Self-Interrupted Attempt (Lifetime) 0   Preparatory Acts or Behavior (Lifetime) 0   Calculated C-SSRS Risk Score (Lifetime/Recent) No Risk Indicated       Personal and Family Medical History:  Patient does report a family history of mental health concerns.  Patient reports family history includes Anxiety Disorder in his brother, brother, and mother; Breast Cancer in his maternal grandmother; Heart Disease in his maternal grandfather and paternal grandfather..     Patient does report Mental Health Diagnosis and/or Treatment.  Patient Patient reported the following previous diagnoses which include(s): ADHD; an anxiety disorder .  Patient reported symptoms began several years ago.  Patient has received mental health services in the past:  therapy; psychiatry  .  Psychiatric Hospitalizations: none when   ,  ,  ,  ,  ,  ,  ,  ,  ,   ,  .  Patient denies a history of civil commitment.  Currently, patient none  receiving other mental health services.  These include none.         Patient has had a physical exam to rule out medical causes for current symptoms.  Date of last physical exam was within the past year. Client was encouraged to follow up with PCP if symptoms were to develop. The patient has a [facility] Primary Care Provider, who is named Davidson Perez..  Patient reports no current medical concerns.  Patient denies any issues with pain..   There are not significant appetite / nutritional concerns / weight changes.   Patient does not report a history of head injury / trauma / cognitive impairment.      Patient reports current meds as:   Outpatient Medications Marked as Taking for the 6/17/22 encounter (Virtual Visit) with Roselyn Gutierrez LICSW   Medication Sig     albuterol (PROAIR HFA/PROVENTIL HFA/VENTOLIN HFA) 108 (90 Base) MCG/ACT inhaler INHALE 1 TO 2 PUFFS INTO THE LUNGS EVERY 4 HOURS AS NEEDED FOR SHORTNESS OF BREATH OR DIFFICULT BREATHING OR WHEEZING     ARIPiprazole (ABILIFY) 10 MG tablet Take 1 tablet (10 mg) by mouth daily     cloNIDine (CATAPRES) 0.2 MG tablet 0.2 mg by Oral or NG Tube route nightly as needed     EPINEPHrine (ANY BX GENERIC EQUIV) 0.3 MG/0.3ML injection 2-pack INJECT INTRAMUSCULARLY ONCE AS DIRECTED AS NEEDED FOR ANAPHYLAXIS     traZODone (DESYREL) 50 MG tablet Take 1 tablet (50 mg) by mouth At Bedtime     [DISCONTINUED] venlafaxine (EFFEXOR-XR) 150 MG 24 hr capsule Take 1 capsule (150 mg) by mouth daily     Takes vyvanse for his ADHD    Medication Adherence:  Patient reports taking.  .    Patient Allergies:    Allergies   Allergen Reactions     Peanuts [Nuts] Anaphylaxis, Nausea and Vomiting and Swelling       Medical History:  No past medical history on file.      Current Mental Status Exam:   Appearance:  Appropriate    Eye Contact:  Good   Psychomotor:  Normal       Gait / station:  no  problem  Attitude / Demeanor: Cooperative   Speech      Rate / Production: Normal/ Responsive      Volume:  Normal  volume      Language:  intact  Mood:   Anxious   Affect:   Appropriate    Thought Content: Clear   Thought Process: Coherent  Logical       Associations: No loosening of associations  Insight:   Good   Judgment:  Intact   Orientation:  All  Attention/concentration: Good      Substance Use:  Patient did not report a family history of substance use concerns; see medical history section for details.  Patient has not received chemical dependency treatment in the past.  Patient has not ever been to detox.      Patient is not currently receiving any chemical dependency treatment.           Substance History of use Age of first use Date of last use     Pattern and duration of use (include amounts and frequency)   Alcohol currently use   20 6/11/2022 2x/month; beer; 3-4   Cannabis   used in the past 18 4/13/2022 Discontinued when he discovered that it made him more anxious     Amphetamines   never used     REPORTS SUBSTANCE USE: N/A   Cocaine/crack    never used       REPORTS SUBSTANCE USE: N/A   Hallucinogens never used         REPORTS SUBSTANCE USE: N/A   Inhalants never used         REPORTS SUBSTANCE USE: N/A   Heroin never used         REPORTS SUBSTANCE USE: N/A   Other Opiates never used     REPORTS SUBSTANCE USE: N/A   Benzodiazepine   never used     REPORTS SUBSTANCE USE: N/A   Barbiturates never used     REPORTS SUBSTANCE USE: N/A   Over the counter meds never used     REPORTS SUBSTANCE USE: N/A   Caffeine currently use 14   unremarkable   Nicotine  currently use 17 6/15/2022 vape but he wants to quit   Other substances not listed above:  Identify:  never used     REPORTS SUBSTANCE USE: N/A     Patient reported the following problems as a result of their substance use: no problems, not applicable.    Substance Use: No symptoms    Based on the negative CAGE score and clinical interview there  are not  indications of drug or alcohol abuse.      Significant Losses / Trauma / Abuse / Neglect Issues:   Patient did not  serve in the .  There are indications or report of significant loss, trauma, abuse or neglect issues related to: are no indications and client denies any losses, trauma, abuse, or neglect concerns.  Concerns for possible neglect are not present.     Safety Assessment:   Patient denies current homicidal ideation and behaviors.  Patient denies current self-injurious ideation and behaviors.    Patient denied risk behaviors associated with substance use.  Patient denies any high risk behaviors associated with mental health symptoms.  Patient reports the following current concerns for their personal safety: None.  Patient reports there are firearms in the house.     yes, they are secured. .    History of Safety Concerns:  Patient denied a history of homicidal ideation.     Patient denied a history of personal safety concerns.    Patient denied a history of assaultive behaviors.    Patient denied a history of sexual assault behaviors.     Patient denied a history of risk behaviors associated with substance use.  Patient denies any history of high risk behaviors associated with mental health symptoms.  Patient reports the following protective factors: safe and stable environment; purpose; other    Risk Plan:  See Recommendations for Safety and Risk Management Plan    Review of Symptoms per patient report:  Depression: No symptoms  Riya:  No Symptoms  Psychosis: No Symptoms  Anxiety: Excessive worry, Nervousness, Sleep disturbance, Ruminations and racing thoughts  Panic:  Shortness of breath, Tingling, Numbness and racing thoughts, crying. His last one was yesterday  Post Traumatic Stress Disorder:  No Symptoms   Eating Disorder: No Symptoms  ADD / ADHD:  Inattentive, Difficulties listening, Poor task completion, Poor organizational skills, Distractibility, Impulsive, Restlessness/fidgety and  [facility]   Conduct Disorder: No symptoms  Autism Spectrum Disorder: No symptoms  Obsessive Compulsive Disorder: Cleaning, Washing and showers 3-4x/day    Patient reports the following compulsive behaviors and treatment history: none.      Diagnostic Criteria:   Generalized Anxiety Disorder  A. Excessive anxiety and worry about a number of events or activities (such as work or school performance).   B. The person finds it difficult to control the worry.  C. Select 3 or more symptoms (required for diagnosis). Only one item is required in children.   - Restlessness or feeling keyed up or on edge.    - Being easily fatigued.    - Difficulty concentrating or mind going blank.    - Irritability.   D. The focus of the anxiety and worry is not confined to features of an Axis I disorder.  E. The anxiety, worry, or physical symptoms cause clinically significant distress or impairment in social, occupational, or other important areas of functioning.   F. The disturbance is not due to the direct physiological effects of a substance (e.g., a drug of abuse, a medication) or a general medical condition (e.g., hyperthyroidism) and does not occur exclusively during a Mood Disorder, a Psychotic Disorder, or a Pervasive Developmental Disorder.    Functional Status:  Patient reports the following functional impairments:  academic performance, relationship(s), social interactions and work / vocational responsibilities.     Nonprogrammatic care:  Patient is requesting basic services to address current mental health concerns.    Clinical Summary:  1. Reason for assessment: anxiety  .  2. Psychosocial, Cultural and Contextual Factors: none  .  3. Principal DSM5 Diagnoses  (Sustained by DSM5 Criteria Listed Above):   300.02 (F41.1) Generalized Anxiety Disorder.  4. Other Diagnoses that is relevant to services:   300.02 (F41.1) Generalized Anxiety Disorder.  5. Provisional Diagnosis:  300.02 (F41.1) Generalized Anxiety Disorder  as evidenced by ROS PHQ FLORINDA and interview .  6. Prognosis: Return to Normal Functioning.  7. Likely consequences of symptoms if not treated: worsening symptoms.  8. Client strengths include:  educated, employed, goal-focused, has a previous history of therapy, insightful, intelligent, motivated and support of family, friends and providers .     Recommendations:     1. Plan for Safety and Risk Management:   Recommended that patient call 911 or go to the local ED should there be a change in any of these risk factors..          Report to child / adult protection services was NA.     2. Patient's identified mental health concerns with a cultural influence will be addressed by NA.     3. Initial Treatment will focus on:    Anxiety - FLORINDA.     4. Resources/Service Plan:    services are not indicated.   Modifications to assist communication are not indicated.   Additional disability accommodations are not indicated.      5. Collaboration:   Collaboration / coordination of treatment will be initiated with the following  support professionals: Collaborated with Meredith Dejesus, MSN, APRN, CNP, [facility], [facility].      6.  Referrals:   The following referral(s) will be initiated: NA. Next Scheduled Appointment: TBD.     A Release of Information has been obtained for the following: NA.    7. ZENY:    ZENY:  Discussed the general effects of drugs and alcohol on health and well-being. Provider gave patient printed information about the effects of chemical use on their health and well being. Recommendations:  NA .     8. Records:   These were reviewed at time of assessment.   Information in this assessment was obtained from the medical record and  provided by patient who is a good historian.    Patient will have open access to their mental health medical record.        Provider Name/ Credentials:  Roselyn Gutierrez [facility]    June 17, 2022

## 2022-06-17 NOTE — PROGRESS NOTES
"Jarad Nolasco is a 21 year old male who is being evaluated via a billable *** visit.      How would you like to obtain your AVS? MyChart  If the video visit is dropped, the invitation should be resent by: Text to cell phone: [phone]  Will anyone else be joining your video visit? No  {If patient encounters technical issues they should call [phone] :[phone]}    Location of patient:  *** If not at home address below, please ask where they are in case of an emergency situation arises during the appointment.  [address] [place] [address]  Any new over the counter medications: {YES / NO:081007::\"No\"}  Have you weighed yourself lately: {YES / NO:684469::\"No\"}  Recent BP/HR: {YES / NO:232744::\"No\"}  Are you taking  your medications every day: {YES / NO:050322::\"No\"}      Answers for HPI/ROS submitted by the patient on 6/17/2022  FLORINDA 7 TOTAL SCORE: 15      "

## 2022-06-17 NOTE — PATIENT INSTRUCTIONS
**For crisis resources, please see the information at the end of this document**     Thank you for coming to the [facility] [place] [facility].    TREATMENT PLAN:    MEDICATIONS:   Will continue this dose as it was just restarted about 4 days ago (off for three months due to insurance issues).  Will look at transitioning to Rexulti as it has a decreased side effect profile and chemically related to the Abilify.  Order placed for 1 mg Rexulti for one week then increase to 2 mg to evaluate if a prior authorization is necessary and the cost of the medication.  Continue the Venlafaxine XR at 150 mg and trazodone 50 mg at bedtime (no scripts needed).  Follow-up 4 weeks.  If Rexulti approved, will taper by taking Abilify 5 mg for one week, Rexulti 1 mg for one week then discontinue the Abilify and increase the Rexulti to 2 mg.     - Consider   Omega 3 Supplementation:  Omega 3's, which are healthy components of fish oil, krill oil, flaxseed oil, and other particular food substances, taken daily also has much data that indicates its benefits in good mental and physical health. Fish oil can improve cholesterol, cardiovascular and neurological health over long term, and has had evidence that it can help with various mental health issues including depression and ADHD.     CONSULTS/REFERRALS:   Continue therapy     LABS/PROCEDURES:   -None at this time   Metabolic labs due 6/23    FOLLOW UP: Schedule an appointment with me in 4 weeks  or sooner as needed.  The intake team should be calling you to schedule.  If you dont hear from them, or they were unable to reach you, please call [phone] to schedule.  Follow up with primary care provider as planned or for acute medical concerns.  Call the psychiatric nurse line with medication questions or concerns at [phone].      Medication Refills:  If you need any refills please call your pharmacy and they will contact us. Our fax number for refills is  [phone]. Please allow three business for refill processing. If you need to  your refill at a new pharmacy, please contact the new pharmacy directly. The new pharmacy will help you get your medications transferred.       Financial Assistance [phone]  [facility] Billing [phone]  Central Billing Office, [facility]: [phone]  [facility] Billing [phone]  Medical Records [phone]  [facility] Patient Bill of Rights [email or web address]       MENTAL HEALTH CRISIS RESOURCES:  For a emergency help, please call 911 or go to the nearest Emergency Department.     Emergency Walk-In Options:   EmPATH Unit @ [facility] ([place]): [phone] - Specialized mental health emergency area designed to be calming  [facility] ([place]): [phone]  [facility] ([place]): [phone]  [facility]): [phone]    [facility] Crisis Information:   [place]: [phone]  [place]: [phone]  [place] ([place]) - Adult: [phone]     Child: [phone]  [place] - Adult: [phone]     Child: [phone]  [place]: [phone]  List of all [facility] resources:   https://[place].gov/dhs/people-we-serve/adults/health-care/mental-health/resources/crisis-contacts.jsp    National Crisis Information:   Crisis Text Line: Text  [place]  to 719777  National Suicide Prevention Lifeline: 3-144-215-NBHR (1-982.282.1356)       For online chat options, visit https://suicidepreventionlifeline.org/chat/  Poison Control Center: 1-288.858.8736  Trans Lifeline: 1-184.649.3179 - Hotline for transgender people of all ages  [employer]: 1-627.281.8963 - Hotline for LGBT youth     For Non-Emergency Support:   Fast Tracker: Mental Health & Substance Use Disorder Resources -   https://www.[employer].org/       Again thank you for choosing [facility] and  please let us know how we can best partner with you to improve you and your family's health.    You may be receiving a survey regarding this appointment. We would love to have your feedback, both positive and negative. The survey is done by an external company, so your answers are anonymous.

## 2022-06-17 NOTE — PROGRESS NOTES
PSYCHIATRIC DIAGNOSTIC ASSESSMENT      Name:  Jarad Nolasco  : 2000    Jarad is a 21 year old who is being evaluated via a billable video visit.      How would you like to obtain your AVS? MyChart  If the video visit is dropped, the invitation should be resent by: Text to cell phone: [phone]  Will anyone else be joining your video visit? No       Telemedicine Visit: The patient's condition can be safely assessed and treated via synchronous audio and visual telemedicine encounter.      Reason for Telemedicine Visit: COVID 19 pandemic and the social and physical recommendations by the CDC and MD.      Originating Site (Patient Location): Patient's home    Distant Site (Provider Location): Provider Remote Setting    Consent:  The patient/guardian has verbally consented to: the potential risks and benefits of telemedicine (video visit or phone) versus in person care; bill my insurance or make self-payment for services provided; and responsibility for payment of non-covered services.     Mode of Communication:  AmNubefy video platform     As the provider I attest to compliance with applicable laws and regulations related to telemedicine.    IDENTIFICATION   Referred by:  Davidson Perez PA-C   Patient Care Team:  Davidson Perez PA-C as PCP - General (Physician Assistant)  Davidson Perez PA-C as Assigned PCP  Therapist: therapist he sees off/on as needed.     History was provided by patient  who were  good historian(s).    Patient attended the session  alone    RECORDS AVAILABLE FOR REVIEW: EHR records through [facility] .  In addition, reviewed the assessment today completed by Roselyn Gutierrez [facility], Behavioral Health Consultant     EVALUATION  PEDIATRIC NEUROPSYCHOLOGY CLINIC  DIVISION OF PEDIATRIC CLINICAL NEUROSCIENCE  Date of service:  3/13/2009  Obsessive-Compulsive Disorder (OCD),   Attention-Deficit/Hyperactivity Disorder (ADHD).                                               CHIEF  "COMPLAINT   Patient is a 21 year old,  White Choose not to answer male  who presents for initial psychiatric evaluation. Referred by   their Primary Care Provider: Davidson Perez PA-C to the [facility] ([facility]) for evaluation of depression and anxiety.  Our psychiatry providers act as a specialty service for Primary Care Providers in the [facility] who seek to optimize medications for unstable patients.  Once medications have been optimized, our providers discharge the patient back to the referring Primary Care Provider for ongoing medication management.  This type of system allows our providers to serve a high volume of patients.      Note by PCP day of referral:  \"anxiety has been really flaring up as of late. \"    HISTORY OF PRESENT ILLNESS   Per [facility], Roselyn Gutierrez, during today's team-based visit:  \"The reason for seeking services at this time is: \"Medication/Anxiety\", mood disorder . Dx at [employer] with anxiety and ADHD as preschooler, per pt and his mother. He was having panic attacks at a very early age. The problem(s) began worsening 5/22/2022.    Stress: anxiety makes things more stressful than they need to be, life in general, next steps in his life (will be graduating college next year)  Goals: \"figure out medications and have consistent check ins to monitor progress\" \"don't feel as panicky, not have racing thoughts, less irritability\"  Most important: abilify worked well but has caused weight gain but he's not concerned right now\"    Reports past diagnosis of depression and anxiety.  Reports anxiety worsened in September 2004, and also began to include more diffuse anxiety (e.g., making decisions, fears of death). His parents were concerned about his level of activity and anxiety, and thus sought treatment (January 2005) at the [facility], where he was referred for cognitive-behavioral therapy and medication management of hyperactive " symptoms. He has been tried on a variety of stimulant and nonstimulant medications to address hyperactive and inattentive symptoms.     The Abilify was initiated and has been stabilizing but he has gained a significant amount of weight (greater than 50 lbs).  Is working out and attempting to eat healthy.   He is home for the summer from college and returns for his senior in the fall of 2022.  He was off due to insurance issues for about three months and recently restarted it.  During that time, he noted increase in OCD symptoms, anxiety, emotional lability, obsessive thinking and racing thoughts.      PSYCHIATRIC HISTORY:   Previous psychiatry: psychiatrist, Joselyn Simmons M.D. ([facility]) years ago.  Most recently he had been seeing a psychiatrist at [facility] but they kept leaving.  Previous therapist: off and on  History of Psychiatric Hospitalizations:   - Inpatient: denies   - IOP/PHP/Day treatment: denies   History of Suicidal Ideation: denies   History of Suicide Attempts:  Denies     History of Self-injurious Behavior: denies   History of Violence/Aggression: denies   History of Commitment? Denies   Electroconvulsive Therapy (ECT) or Transcranial Magnetic Stimulation (TMS): denies   PharmacogenomicTesting (such as GeneSight): denies     PSYCHIATRIC REVIEW OF SYSTEMS:   Sleep: adequate          Depression: No symptoms  Riya: No Symptoms  Psychosis: No Symptoms  Anxiety: Excessive worry, Nervousness, Sleep disturbance, Ruminations and racing thoughts  Panic: Shortness of breath, Tingling, Numbness and racing thoughts, crying. His last one was yesterday  Post Traumatic Stress Disorder: No Symptoms  Eating Disorder: No Symptoms  ADD / ADHD: Inattentive, Difficulties listening, Poor task completion, Poor organizational skills, Distractibility, Impulsive, Restlessness/fidgety and previously dx at [employer]  Conduct Disorder: No symptoms  Autism Spectrum Disorder: No symptoms  Obsessive Compulsive Disorder:  Cleaning, Washing and showers 3-4x/day  Patient reports the following compulsive behaviors and treatment history: none  Diet: No Restrictions  Exercise: adequate     ASSESSMENT SCALES:  PHQ-9 SCORE 7/23/2021 12/24/2021 6/14/2022   PHQ-9 Total Score MyChart - - 6 (Mild depression)   PHQ-9 Total Score 1 3 6       Last PHQ-9 6/14/2022   1.  Little interest or pleasure in doing things 1   2.  Feeling down, depressed, or hopeless 2   3.  Trouble falling or staying asleep, or sleeping too much 1   4.  Feeling tired or having little energy 1   5.  Poor appetite or overeating 0   6.  Feeling bad about yourself 0   7.  Trouble concentrating 1   8.  Moving slowly or restless 0   Q9: Thoughts of better off dead/self-harm past 2 weeks 0   PHQ-9 Total Score 6   Difficulty at work, home, or with people -   In the past two weeks have you had thoughts of suicide or self harm? -   Do you have concerns about your personal safety or the safety of others? -     PHQ9 score is 6 indicating mild depression.   Suicidal ideation:  Denies    FLORINDA-7 SCORE 12/24/2021 6/17/2022 6/17/2022   Total Score - - 15 (severe anxiety)   Total Score 7 15 15     FLORINDA-7   [employer], 2002; Used with Permission) 8/7/2018 11/20/2018 4/5/2019 7/23/2021 12/24/2021 6/17/2022 6/17/2022   1. Feeling nervous, anxious, or on edge 2 2 3 2 2 3 3   2. Not being able to stop or control worrying 3 2 3 1 1 3 3   3. Worrying too much about different things 1 3 3 2 1 2 2   4. Trouble relaxing 0 1 3 0 0 2 2   5. Being so restless that it is hard to sit still 0 1 1 0 0 1 1   6. Becoming easily annoyed or irritable 1 0 1 1 2 2 2   7. Feeling afraid, as if something awful might happen 0 1 2 0 1 2 2   FLORINDA-7 Total Score 7 10 16 6 7 15 15   If you checked any problems, how difficult have they made it for you to do your work, take care of things at home, or get along with other people? Somewhat difficult Very difficult Very difficult Somewhat difficult Somewhat difficult - -     GAD7  score is  is  15 indicating severe anxiety.    A 12-item WHODAS 2.0 assessment was completed by the patient today and recorded in EPIC.  No flowsheet data found.    All other ROS negative.     FAMILY, MEDICAL, SURGICAL HISTORY REVIEWED.  MEDICATION HAVE BEEN REVIEWED AND ARE CURRENT TO THE BEST OF MY KNOWLEDGE AND ABILITY.  Student in [place], 4.0, biology, environmental sciences.   Working full time   Home for the summer    MEDICATIONS                                                                                                Current Outpatient Medications   Medication Sig     albuterol (PROAIR HFA/PROVENTIL HFA/VENTOLIN HFA) 108 (90 Base) MCG/ACT inhaler INHALE 1 TO 2 PUFFS INTO THE LUNGS EVERY 4 HOURS AS NEEDED FOR SHORTNESS OF BREATH OR DIFFICULT BREATHING OR WHEEZING     ARIPiprazole (ABILIFY) 10 MG tablet Take 1 tablet (10 mg) by mouth daily     cloNIDine (CATAPRES) 0.2 MG tablet TK 1 T PO HS     EPINEPHrine (ANY BX GENERIC EQUIV) 0.3 MG/0.3ML injection 2-pack INJECT INTRAMUSCULARLY ONCE AS DIRECTED AS NEEDED FOR ANAPHYLAXIS     traZODone (DESYREL) 50 MG tablet Take 1 tablet (50 mg) by mouth At Bedtime     venlafaxine (EFFEXOR-XR) 150 MG 24 hr capsule Take 1 capsule (150 mg) by mouth daily     No current facility-administered medications for this visit.       DRUG MONITORING:  [place] Prescription Monitoring Program evaluating controlled substances in the last year in [place]:  [place] Pres[place] [] was checked today:  not using controlled substances..    CURRENT MEDICATION SIDE EFFECTS REPORTED:  Weight gain    NOTES ABOUT CURRENT PSYCHOTROPIC MEDICATIONS:   Abilify 10 mg, 18 months approximately.  Was off for three months and started taking four days ago, 190 lbs and up to 260 lbs but this has helped the most for for OCD symptoms, anxiety, emotional lability, obsessive thinking and racing thoughts.  Venlafaxine  mg approximately a year.  Trazodone 50 mg, many years  Vyvanse 50 mg.   "He reports that he's taking vyvanse but it's not on the med sheet. states that it was originally prescribed by Est[facility] and his provider at [facility] has been refilling the rx for him.  Typically only takes during school    PAST PSYCHOTROPIC MEDICATIONS:  pharmacogenomic testing completed.    Many medication trials   Quetiapine   Olanzapine      VITALS   There were no vitals taken for this visit.     BP Readings from Last 1 Encounters:   22 (!) 128/96     Pulse Readings from Last 1 Encounters:   22 76     Wt Readings from Last 1 Encounters:   22 111.5 kg (245 lb 12.8 oz)     Ht Readings from Last 1 Encounters:   22 1.74 m (5' 8.5\")     Estimated body mass index is 36.83 kg/m  as calculated from the following:    Height as of 22: 1.74 m (5' 8.5\").    Weight as of 22: 111.5 kg (245 lb 12.8 oz).      PERTINENT HISTORY   Ilia was the 8 pound, 8 ounce product of a full term pregnancy. During the pregnancy, Mrs. Franks experienced panic attacks, which were treated with anxiety medication. Labor was induced and delivery was complicated by repeated maternal vomiting. Apgars were 9 at 1 minute and 10 at 5 minutes. The  course was unremarkable. At 10 weeks, Ilia was hospitalized for 17 days due to asthma and RSV. When Ilia was 18 months, he received PE tubes to treat chronic otitis media, and when he was 2 years old, he had adenoidectomy. Ilia met developmental milestones in age-appropriate fashion, but later attended speech therapy to address articulation issues. Ilia s medical history is also significant for two fractures of the arm (ages 2   and 4), the latter of which was complex. Further, numerous injuries from hyperactive behavior have required  lots of stitches  over time.     Patient Active Problem List   Diagnosis     Mild persistent asthma     Peanut allergy     FLORINDA (generalized anxiety disorder)     Attention deficit disorder, unspecified hyperactivity presence     Primary " insomnia     Moderate episode of recurrent major depressive disorder (H)      Seizures or Head Injury: Denies history of head injury. Denies history of seizures.  History of cardiac disease, rheumatic fever, fainting or dizziness, especially with exercise, seizures, chest pain or shortness of breath with exercise, unexplained change in exercise tolerance, palpitations, high blood pressure, or heart murmur?   No No past surgical history on file.     SOCIAL HISTORY  Patient reported they grew up in [place], [place].  They were raised by biological parents  .  Parents were always together. Siblings: 3 brothers. Patient reported that their childhood was good, supportive family.  Patient described their current relationships with family of origin as good.       Relationship status: single  Children: denies   Highest education level was some college.    Service: denies   Employment status: working in the summer    Trauma history: Denies  ACES (Adverse Childhood Experiences):  None.  Grew up in an intact home with all basic needs being met     LEGAL:  Denies     SUBSTANCE USE HISTORY  Social History     Tobacco Use     Smoking status: Former Smoker     Packs/day: 0.00     Years: 0.00     Pack years: 0.00     Smokeless tobacco: Never Used   Substance Use Topics     Alcohol use: Yes     Comment: social   Caffeine:  Unremarkable   Current substance use:   Alcohol: 2x/month; beer; 3-4   Cannabis: Discontinued when he discovered that it made him more anxious     Past use alcohol/substance use: denies     Chemical dependency history: Patient has not received chemical dependency treatment in the past       Family History   Problem Relation Age of Onset     Anxiety Disorder Mother      Anxiety Disorder Brother      Anxiety Disorder Brother      Heart Disease Maternal Grandfather         Pacemaker     Breast Cancer Maternal Grandmother      Heart Disease Paternal Grandfather           PERTINENT FAMILY PSYCHIATRIC HISTORY  NOTES  Immediate family medical history is significant for speech and language delays, reading problems, anxiety and cancer. History among second-degree relatives is positive for migraines, speech and language delays, oppositional defiant disorder, depression, anxiety, and obsessive-compulsive disorder, antisocial behavior, substance abuse, autism spectrum disorders, cancer, and heart disease.   Substance use history in family:  Denies   Family suicide history: denies   Medications family responded to: Unknown    Based on the clinical interview, there  are not indications of drug or alcohol abuse.      LABS & IMAGING                                                                                                                   Recent Labs   Lab Test 05/17/18  1418   WBC 7.2   HGB 14.8   HCT 43.5   MCV 84      ANEU 3.8     Recent Labs   Lab Test 06/14/22  0754      POTASSIUM 4.3   CHLORIDE 108   CO2 29   GLC 97   EDWIN 9.8   BUN 10   CR 0.74   GFRESTIMATED >90     Recent Labs   Lab Test 06/14/22  0754   CHOL 166   *   HDL 54   TRIG 47     Recent Labs   Lab Test 06/14/22  0754   TSH 0.49        ALLERGY & IMMUNIZATIONS       Allergies   Allergen Reactions     Peanuts [Nuts] Anaphylaxis, Nausea and Vomiting and Swelling           MEDICAL REVIEW OF SYSTEMS:   Ten system review was completed with pertinent positives noted above    MENTAL STATUS EXAM:   General/Constitutional:  Appearance:   awake, alert, adequately groomed, appeared stated age and no apparent distress  Attitude:    cooperative   Eye Contact:  good  Musculoskeletal:  Psychomotor Behavior:  no evidence of tardive dyskinesia, dystonia, or tics from the head up  Psychiatric:  Speech:  clear, coherent, regular rate, rhythm, and volume,   No pressure speech noted.  Associations:  no loose associations  Thought Process:   logical, linear and goal oriented  Thought Content:    No evidence of suicidal ideation or homicidal ideation, no evidence  of psychotic thought, no auditory hallucinations present and no visual hallucinations present  Mood:  better  Affect:  full range/stable (normal variation of emotions during exam) and was congruent to speech content.  Insight:  good  Judgment:  intact, adequate for safety  Impulse Control:  intact  Neurological:  Oriented to:  person, place, time, and situation  Attention Span and Concentration:  normal    Language: intact     Recent and Remote Memory:  Intact to interview. Not formally assessed. No amnesia.    Fund of Knowledge: appropriate       SAFETY   Feels safe in home: yes    Suicidal ideation: Denies  History of suicide attempts:  No   Hx of impulsivity: No   Hope for the future: present    Hx of Command hallucinations or current psychosis:  Denies   History of Self-injurious behaviors: denies   Family member  by suicide: Not discussed     SAFETY ASSESSMENT:   Based on all available evidence including the factors cited above, overall Risk for harm is low and is appropriate for outpatient level of care.   Recommended that patient call 911 or go to the local ED should there be a change in any of these risk factors.         LANGUAGE OR COMMUNICATION BARRIERS   Are there language or communication issues or need for modification in treatment? No   Are there ethnic, cultural or Tenriism factors that may be relevant for therapy? No  Client identified their preferred language to be fluent English in conversational context  Does the client need the assistance of an  or other support involved in therapy? 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      ASSESSMENT AND PLAN    Jarad Nolasco is a 21 year old White Choose not to answer male presenting for psychiatric evaluation and  medication management through the [facility].  Information is obtained from patient and available records.  Reports history of depression, anxiety, obsessive thoughts and ADHD.  Denies prior psychiatric hospitalizations. No history of suicidal thoughts or attempts. No history of self-injurious behaviors. Grew up in an intact home with all basic needs being met.          Problem List Items Addressed This Visit        Behavioral     70 lb weight gain with the Abilify however, it has been effective.  Will continue this dose as it was just restarted about 4 days ago (off for three months due to insurance issues).  Will look at transitioning to Rexulti as it has a decreased side effect profile and chemically related to the Abilify.  Order placed for 1 mg Rexulti for one week then increase to 2 mg to evaluate if a prior authorization is necessary and the cost of the medication.  Continue the Venlafaxine XR at 150 mg and trazodone 50 mg at bedtime (no scripts needed).  Follow-up 4 weeks.  If Rexulti approved, will taper by taking Abilify 5 mg for one week, Rexulti 1 mg for one week then discontinue the Abilify and increase the Rexulti to 2 mg.               FLORINDA (generalized anxiety disorder)    Relevant Medications    venlafaxine (EFFEXOR XR) 150 MG 24 hr capsule    Moderate episode of recurrent major depressive disorder (H)    Relevant Medications    brexpiprazole (REXULTI) 1 MG tablet    venlafaxine (EFFEXOR XR) 150 MG 24 hr capsule      Other Visit Diagnoses     Other obsessive-compulsive disorders        Relevant Medications    venlafaxine (EFFEXOR XR) 150 MG 24 hr capsule             CONSULTS/REFERRALS:   Continue therapy   Coordinate care with therapist as needed    MEDICAL:   Metabolic labs due 6/2023.  Last completed 6/14/2022 of lipids and fasting glucose  Coordinate care with PCP (Davidson Perez) as needed  Follow up with primary care provider as planned or for acute medical  concerns.    PSYCHOEDUCATION:  Medication side effects and alternatives reviewed. Health promotion activities recommended and reviewed today. All questions addressed. Education and counseling completed regarding risks and benefits of medications and psychotherapy options.  Consent provided by patient/guardian  Call the psychiatric nurse line with medication questions or concerns at [phone].  Conductricshart may be used to communicate with your provider, but this is not intended to be used for emergencies.  FIRST GENERATION ANTIPSYCHOTIC/ SECOND GENERATION ANTIPSYCHOTIC USE:  Atypical need for cardiometabolic monitoring with medication- B/P, weight, blood sugar, cholesterol.  Need to monitor for abnormal movements taught  Medlineplus.gov is information for patients.  It is run by the [employer] and it contains information about all disorders, diseases and all medications.      COMMUNITY RESOURCES:    CRISIS NUMBERS: Provided in AVS 6/17/2022  National Suicide Prevention Lifeline: 8-982-087-TALK (007-167-6245)  [employer]/resources for a list of additional resources (SOS)            [facility] - [phone]   Urgent Care Adult Mental Jsihng-[phone] mobile unit/ 24/7 crisis line  [facility] -[phone]   COPE 24/7 [place] Mobile Team -[phone] (adults)/ [phone] (child)  Poison Control Center - 1-624.598.8461    OR  go to nearest ER  Crisis Text Line for any crisis 24/7 send this-   To: 767072   [facility]  [phone]  National Suicide Prevention Lifeline: 877.524.8288 (TTY: 246.546.7379). Call anytime for help.  (www.suicidepreventionlifeline.org)  National [place] on Mental Illness (www.[place].org): 884.748.6362 or 095-818-5383.   Mental Health Association (www.mentalhealth.org): 222.480.5627 or 177-532-9377.  [place] Crisis Text Line: Text [place] to 713420  Suicide LifeLine Chat:  suicidepreventionlifeline.org/DrDoctor    ADMINISTRATIVE BILLIN min spent on the date of the encounter in chart review, patient visit, review of tests, documentation, care coordination, and/or discussion with other providers about the issues documented above.    Video/Phone Start Time:  3:00 pm  Video/Phone End Time:  3:46    Greater than 50% of time was spent in counseling and coordination of care regarding above diagnoses and treatment plan.     Patient Status:  Our psychiatry providers act as a specialty service for Primary Care Providers in the [facility] that seek to optimize medications for unstable patients.  Once medications have been optimized, our providers discharge the patient back to the referring Primary Care Provider for ongoing medication management.  This type of system allows our providers to serve a high volume of patients. At this time  Patient will continue to be seen for ongoing consultation and stabilization.    Signed:   Meredith Dejesus, MSN, APRN, FMHNP-[facility] ([facility])   Chart documentation done in part with Dragon Voice Recognition software.  Although reviewed after completion, some word and grammatical errors may remain.

## 2022-06-17 NOTE — TELEPHONE ENCOUNTER
Prior Authorization Specialty Medication Request    Medication/Dose: Rexulti titrated to 2 mg  ICD code (if different than what is on RX):     Previously Tried and Failed:    Abilify (effective by 60 lb weight gain)  Quetiapine   Olanzapine     Important Lab Values: Metabolic labs including HgbA1c and fasting lipids within normal limits   Rationale: patient is experiencing mood stabilization on the Abilify however, over 60 lb weight gain from 190-260 lbs    Insurance Name: [employer]  Insurance ID: [id number]  Insurance Phone Number:      Pharmacy Information (if different than what is on RX)

## 2022-06-22 NOTE — TELEPHONE ENCOUNTER
Prior Authorization Not Needed per Insurance    Medication: Rexulti 1mg - NOT NEEDED  Insurance Company: [employer] - Phone [phone] Fax [phone]  Expected CoPay:      Pharmacy Filling the Rx: [id number] - [place], [place] - [address] AT [facility] [address]  Pharmacy Notified: Yes  Patient Notified: Yes - patient picked up on 6/20

## 2022-06-22 NOTE — TELEPHONE ENCOUNTER
Central Prior Authorization Team   Phone: [phone]  PA Initiation    Medication: Rexulti 1mg - INITIATED  Insurance Company: [employer] - Phone [phone] Fax [phone]  Pharmacy Filling the Rx: [id number] - [place], [place] - [address] [facility] [address]  Filling Pharmacy Phone: [phone]  Filling Pharmacy Fax:    Start Date: 6/22/2022

## 2022-07-14 NOTE — PROGRESS NOTES
"[facility] behavioral health CCPS  July 15, 2022      Behavioral Health Clinician Progress Note    Patient Name: Jarad Nolasco \"Ilia\"          Service Type:  Individual      Service Location:   MyChart / Email (patient reached)     Session Start Time: 330pm  Session End Time: 336pm      Session Length: 6 min     Attendees: Patient     Service Modality:  Video Visit:      Provider verified identity through the following two step process.  Patient provided:  Patient     Telemedicine Visit: The patient's condition can be safely assessed and treated via synchronous audio and visual telemedicine encounter.      Reason for Telemedicine Visit: Services only offered telehealth    Originating Site (Patient Location): Patient's home    Distant Site (Provider Location): Provider Remote Setting- Home Office    Consent:  The patient/guardian has verbally consented to: the potential risks and benefits of telemedicine (video visit) versus in person care; bill my insurance or make self-payment for services provided; and responsibility for payment of non-covered services.     Patient would like the video invitation sent by:  My Chart    Mode of Communication:  Video Conference via [employer]well    As the provider I attest to compliance with applicable laws and regulations related to telemedicine.    Visit Activities (Refresh list every visit): [facility] [place] Only    Diagnostic Assessment Date: 2022  Treatment Plan Review Date: 10/15/2022  See Flowsheets for today's PHQ-9 and FLORINDA-7 results  Previous PHQ-9:   PHQ-9 SCORE 2021   PHQ-9 Total Score MyChart - - 6 (Mild depression)   PHQ-9 Total Score 1 3 6     Previous FLORINDA-7:   FLORINDA-7 SCORE 2021   Total Score - - 15 (severe anxiety)   Total Score 7 15 15       TRI LEVEL:  No flowsheet data found.    DATA  Extended Session (60+ minutes): No  Interactive Complexity: No  Crisis: No  [facility] Patient: No    Treatment Objective(s) " Addressed in This Session:  Target Behavior(s): decrease anxiety    Anxiety: will develop more effective coping skills to manage anxiety symptoms    Current Stressors / Issues:  MH update: Pt reports that not much has changed. Still feels panicky frequently. He was able to transition off old med on to a new one without much difficulty. He was fully transitioned about 3 weeks ago. He feels like his ADHD is well managed. His work life has improved.   Stresses: No  Appetite: unremarkable  Sleep: unremarkable  Therapy: No  ZENY: No  Preg: NA  Most important: No medication side effects    Progress on Treatment Objective(s) / Homework:  No improvement - ACTION (Actively working towards change); Intervened by reinforcing change plan / affirming steps taken    Motivational Interviewing    MI Intervention: Co-Developed Goal: improve anxiety, Expressed Empathy/Understanding, Open-ended questions, Reflections: simple and complex, Change talk (evoked) and Reframe     Change Talk Expressed by the Patient: Desire to change Ability to change Reasons to change Need to change Committment to change Activation Taking steps    Provider Response to Change Talk: E - Evoked more info from patient about behavior change, A - Affirmed patient's thoughts, decisions, or attempts at behavior change, R - Reflected patient's change talk and S - Summarized patient's change talk statements      Care Plan review completed: Yes    Medication Review:  No changes to current psychiatric medication(s)    Medication Compliance:  Yes    Changes in Health Issues:   None reported    Chemical Use Review:   Substance Use: Chemical use reviewed, no active concerns identified      Tobacco Use: No current tobacco use.      Assessment: Current Emotional / Mental Status (status of significant symptoms):  Risk status (Self / Other harm or suicidal ideation)  Patient denies a history of suicidal ideation, suicide attempts, self-injurious behavior, homicidal ideation,  homicidal behavior and and other safety concerns  Patient denies current fears or concerns for personal safety.  Patient denies current or recent suicidal ideation or behaviors.  Patient denies current or recent homicidal ideation or behaviors.  Patient denies current or recent self injurious behavior or ideation.  Patient denies other safety concerns.  A safety and risk management plan has not been developed at this time, however patient was encouraged to call [place] [phone] should there be a change in any of these risk factors.    Appearance:   Appropriate   Eye Contact:   Good   Psychomotor Behavior: Normal   Attitude:   Cooperative   Orientation:   All  Speech   Rate / Production: Normal    Volume:  Normal   Mood:    Anxious   Affect:    Appropriate   Thought Content:  Clear   Thought Form:  Coherent  Logical   Insight:    Good     Diagnoses:  1. Generalized anxiety disorder        Collateral Reports Completed:  Collaborated with Meredith Dejesus, MSN, APRN, CNP, LOLIP-BC, Nathaniel DORSEYS    Plan: (Homework, other):  Patient was given information about behavioral services and encouraged to schedule a follow up appointment with the clinic [facility] in 1 month.  He was also given information about mental health symptoms and treatment options .  CD Recommendations: No indications of CD issues. Roselyn Gutierrez [facility]         ______________________________________________________________________    Integrated Primary Care Behavioral Health Treatment Plan    Patient's Name: Jarad Nolasco  YOB: 2000    Date of Creation: 7/15/2022  Date Treatment Plan Last Reviewed/Revised: NA    DSM5 Diagnoses: 300.02 (F41.1) Generalized Anxiety Disorder  Psychosocial / Contextual Factors: none  PROMIS (reviewed every 90 days): 23    Referral / Collaboration:  Collaborated with Meredith Dejesus, MSN, APRN, CNP, LOLIP-BC, Nathaniel CCPS.    Anticipated number of session for this episode of care: 4-6  Anticipation  frequency of session: Monthly  Anticipated Duration of each session: 16-37 minutes  Treatment plan will be reviewed in 90 days or when goals have been changed.       MeasurableTreatment Goal(s) related to diagnosis / functional impairment(s)  Goal 1: Patient will experience a reduction in anxiety    I will know I've met my goal when don't feel as panicky, not have racing thoughts, less irritability.      Objective #A (Patient Action)    Patient will use at least 4 coping skills for anxiety management in the next 4 weeks.  Status: Continued - Date(s): 10/15/2022    Intervention(s)  Therapist will teach emotional regulation skills. to manage anxiety.      Patient has reviewed and agreed to the above plan.      Roselyn Gutierrez, [facility]  July 15, 2022

## 2022-07-15 ENCOUNTER — VIRTUAL VISIT (OUTPATIENT)
Dept: PSYCHIATRY | Facility: CLINIC | Age: 22
End: 2022-07-15
Payer: COMMERCIAL

## 2022-07-15 ENCOUNTER — VIRTUAL VISIT (OUTPATIENT)
Dept: BEHAVIORAL HEALTH | Facility: CLINIC | Age: 22
End: 2022-07-15
Payer: COMMERCIAL

## 2022-07-15 DIAGNOSIS — F41.1 GENERALIZED ANXIETY DISORDER: Primary | ICD-10-CM

## 2022-07-15 DIAGNOSIS — F33.1 MODERATE EPISODE OF RECURRENT MAJOR DEPRESSIVE DISORDER (H): Primary | ICD-10-CM

## 2022-07-15 PROCEDURE — 99214 OFFICE O/P EST MOD 30 MIN: CPT | Mod: GT | Performed by: NURSE PRACTITIONER

## 2022-07-15 PROCEDURE — 99207 PR NO BILLABLE SERVICE THIS VISIT: CPT | Performed by: SOCIAL WORKER

## 2022-07-15 NOTE — PROGRESS NOTES
PSYCHIATRIC MEDICATION FOLLOW UP APPT     Name:  Jarad Nolasco  : 2000    Jarad Nolasco is a 21 year old male who is being evaluated via a billable video visit.      How would you like to obtain your AVS? MyChart  If the video visit is dropped, the invitation should be resent by: Text to cell phone: [phone]  Will anyone else be joining your video visit? No       Location of patient:    If not at home address below, please ask where they are in case of an emergency situation arises during the appointment.  [address] [id number]    Telemedicine Visit: The patient's condition can be safely assessed and treated via synchronous audio and visual telemedicine encounter.       Reason for Telemedicine Visit: COVID 19 pandemic and the social and physical recommendations by the CDC and MD.       Originating Site (Patient Location): Patient's home     Distant Site (Provider Location): Provider Remote Setting     Consent:  The patient/guardian has verbally consented to: the potential risks and benefits of telemedicine (video visit or phone) versus in person care; bill my insurance or make self-payment for services provided; and responsibility for payment of non-covered services.      Mode of Communication:  [employer] video platform      As the provider I attest to compliance with applicable laws and regulations related to telemedicine.     IDENTIFICATION   Referred by:  Davidson Perez PA-C   Patient Care Team:  Davidson Perez PA-C as PCP - General (Physician Assistant)  Davidson Perez PA-C as Assigned PCP  Therapist: therapist he sees off/on as needed.     Patient attended the phone/video session alone.    Last seen for outpatient psychiatry Consultation on 2022.      FOLLOWING PLAN PUT INTO PLACE: 70 lb weight gain with the Abilify however, it has been effective.  Will continue this dose as it was just restarted about 4 days ago (off for three months due to insurance  issues).  Will look at transitioning to Rexulti as it has a decreased side effect profile and chemically related to the Abilify.  Order placed for 1 mg Rexulti for one week then increase to 2 mg to evaluate if a prior authorization is necessary and the cost of the medication.  Continue the Venlafaxine XR at 150 mg and trazodone 50 mg at bedtime (no scripts needed).  Follow-up 4 weeks.  If Rexulti approved, will taper by taking Abilify 5 mg for one week, Rexulti 1 mg for one week then discontinue the Abilify and increase the Rexulti to 2 mg.         INTERIM HISTORY     COMMUNICATIONS FROM PATIENT VIA:  none    RECORDS AVAILABLE FOR REVIEW: EHR records through "[employer] ([email or web address]  and previous psychiatric progress note.  In addition, reviewed the assessment completed by MISHA Mohan, dated today        HISTORY OF PRESENT ILLNESS   CCPS referral for psychiatric medication consult in June 2022.  Reports history of depression, anxiety, obsessive thoughts and ADHD.  Denies prior psychiatric hospitalizations. No history of suicidal thoughts or attempts. No history of self-injurious behaviors. Grew up in an intact home with all basic needs being met.     Reports past diagnosis of depression and anxiety.  Reports anxiety worsened in September 2004, and also began to include more diffuse anxiety (e.g., making decisions, fears of death). His parents were concerned about his level of activity and anxiety, and thus sought treatment (January 2005) at the [facility], where he was referred for cognitive-behavioral therapy and medication management of hyperactive symptoms. He has been tried on a variety of stimulant and nonstimulant medications to address hyperactive and inattentive symptoms.      The Abilify was initiated and has been stabilizing but he has gained a significant amount of weight (greater than 50 lbs).  Is working out and attempting to eat healthy.   He is home for the summer from college and  returns for his senior in the fall of 2022.  He was off due to insurance issues for about three months and recently restarted it.  During that time, he noted increase in OCD symptoms, anxiety, emotional lability, obsessive thinking and racing thoughts.       FAMILY, MEDICAL, SURGICAL HISTORY REVIEWED.  MEDICATION HAVE BEEN REVIEWED AND ARE CURRENT TO THE BEST OF MY KNOWLEDGE AND ABILITY.  Student in [place], 4.0, biology, environmental sciences.   Working full time   Home for the summer         MEDICATIONS                                                                                                Current Outpatient Medications   Medication Sig     albuterol (PROAIR HFA/PROVENTIL HFA/VENTOLIN HFA) 108 (90 Base) MCG/ACT inhaler INHALE 1 TO 2 PUFFS INTO THE LUNGS EVERY 4 HOURS AS NEEDED FOR SHORTNESS OF BREATH OR DIFFICULT BREATHING OR WHEEZING     ARIPiprazole (ABILIFY) 10 MG tablet Take 1 tablet (10 mg) by mouth daily     brexpiprazole (REXULTI) 1 MG tablet Take 1 tablet (1 mg) by mouth daily 1/2 tablet daily for one week then increase to 1 tablet thereafter.     cloNIDine (CATAPRES) 0.2 MG tablet 0.2 mg by Oral or NG Tube route nightly as needed     EPINEPHrine (ANY BX GENERIC EQUIV) 0.3 MG/0.3ML injection 2-pack INJECT INTRAMUSCULARLY ONCE AS DIRECTED AS NEEDED FOR ANAPHYLAXIS     traZODone (DESYREL) 50 MG tablet Take 1 tablet (50 mg) by mouth At Bedtime     venlafaxine (EFFEXOR XR) 150 MG 24 hr capsule Take 1 capsule (150 mg) by mouth daily     No current facility-administered medications for this visit.        NOTES ABOUT CURRENT PSYCHOTROPIC MEDICATIONS:   Rexulti 1 mg  Venlafaxine  mg approximately a year.  Trazodone 50 mg, many years  Vyvanse 50 mg.  He reports that he's taking vyvanse but it's not on the med sheet. states that it was originally prescribed by Estela and his provider at college has been refilling the rx for him.  Typically only takes during school     PAST PSYCHOTROPIC  "MEDICATIONS:  pharmacogenomic testing completed.    Many medication trials   Quetiapine   Olanzapine  Abilify 10 mg, 18 months approximately.  Was off for three months and started taking four days ago, 190 lbs and up to 260 lbs but this has helped the most for for OCD symptoms, anxiety, emotional lability, obsessive thinking and racing thoughts.    TODAY PATIENT REPORTS THE FOLLOWING PSYCHIATRIC ROS:   Per [facility], Roselyn Gutierrez, during today's team-based visit:  \"MH update: Pt reports that not much has changed. Still feels panicky frequently. He was able to transition off old med on to a new one without much difficulty. He was fully transitioned about 3 weeks ago. He feels like his ADHD is well managed. His work life has improved.  Stresses: No  Appetite: unremarkable  Sleep: unremarkable  Therapy: No  ZENY: No  Preg: NA  Most important: No medication side effects\"     EXERCISE: Adequate  SIDE EFFECTS:   tolerating medications without reported side effects  COMPLIANCE:   states Adherent to medication regimen  REPORTS THE FOLLOWING NEW MEDICAL ISSUES:   none    PROBLEM: DEPRESSION: Feels the current medication regimen is effective.  Tolerated the transition from Abilify and onto Rexulti   Last PHQ-9 6/14/2022   1.  Little interest or pleasure in doing things 1   2.  Feeling down, depressed, or hopeless 2   3.  Trouble falling or staying asleep, or sleeping too much 1   4.  Feeling tired or having little energy 1   5.  Poor appetite or overeating 0   6.  Feeling bad about yourself 0   7.  Trouble concentrating 1   8.  Moving slowly or restless 0   Q9: Thoughts of better off dead/self-harm past 2 weeks 0   PHQ-9 Total Score 6   Difficulty at work, home, or with people -   In the past two weeks have you had thoughts of suicide or self harm? -   Do you have concerns about your personal safety or the safety of others? -     PHQ-9 SCORE 7/23/2021 12/24/2021 6/14/2022   PHQ-9 Total Score MyChart - - 6 (Mild depression) " "  PHQ-9 Total Score 1 3 6     PHQ9 score is Not completed today  Suicidal ideation:  No     PROBLEM: ANXIETY: No change.    GAD7 score is Not completed today  FLORINDA-7 SCORE 12/24/2021 6/17/2022 6/17/2022   Total Score - - 15 (severe anxiety)   Total Score 7 15 15     PROBLEM: SLEEP/INSOMNIA: Stable    PERTINENT PAST MEDICAL AND SURGICAL HISTORY   No past medical history on file.    VITALS     BP Readings from Last 1 Encounters:   06/14/22 (!) 128/96     Pulse Readings from Last 1 Encounters:   06/14/22 76     Wt Readings from Last 1 Encounters:   06/14/22 111.5 kg (245 lb 12.8 oz)     Ht Readings from Last 1 Encounters:   06/14/22 1.74 m (5' 8.5\")     Estimated body mass index is 36.83 kg/m  as calculated from the following:    Height as of 6/14/22: 1.74 m (5' 8.5\").    Weight as of 6/14/22: 111.5 kg (245 lb 12.8 oz).    LABS & IMAGING                                                                                                                   Recent Labs   Lab Test 05/17/18  1418   WBC 7.2   HGB 14.8   HCT 43.5   MCV 84      ANEU 3.8     Recent Labs   Lab Test 06/14/22  0754      POTASSIUM 4.3   CHLORIDE 108   CO2 29   GLC 97   EDWIN 9.8   BUN 10   CR 0.74   GFRESTIMATED >90     Recent Labs   Lab Test 06/14/22  0754   CHOL 166   *   HDL 54   TRIG 47     Recent Labs   Lab Test 06/14/22  0754   TSH 0.49          ALLERGY & IMMUNIZATIONS       Allergies   Allergen Reactions     Peanuts [Nuts] Anaphylaxis, Nausea and Vomiting and Swelling       MEDICAL REVIEW OF SYSTEMS:   Ten system review was completed with pertinent positives noted     MENTAL STATUS EXAM:      General/Constitutional:  Appearance:   awake, alert, adequately groomed, appeared stated age and no apparent distress  Attitude:    cooperative   Eye Contact:  good  Musculoskeletal:  Psychomotor Behavior:  no evidence of tardive dyskinesia, dystonia, or tics from the head up  Psychiatric:  Speech:  clear, coherent, regular rate, rhythm, and " volume,   No pressure speech noted.  Associations:  no loose associations  Thought Process:   logical, linear and goal oriented  Thought Content:    No evidence of suicidal ideation or homicidal ideation, no evidence of psychotic thought, no auditory hallucinations present and no visual hallucinations present  Mood:  anxiety is primary  Affect:  full range/stable (normal variation of emotions during exam) and was congruent to speech content.  Insight:  good  Judgment:  intact, adequate for safety  Impulse Control:  intact  Neurological:  Oriented to:  person, place, time, and situation  Attention Span and Concentration:  normal    Language: intact     Recent and Remote Memory:  Intact to interview. Not formally assessed. No amnesia.    Fund of Knowledge: appropriate        SAFETY   Feels safe in home: yes    Suicidal ideation: Denies  History of suicide attempts:  No   Hx of impulsivity: No   Hope for the future: present    Hx of Command hallucinations or current psychosis:  Denies   History of Self-injurious behaviors: denies   Family member  by suicide: Not discussed      SAFETY ASSESSMENT:   Based on all available evidence including the factors cited above, overall Risk for harm is low and is appropriate for outpatient level of care.   Recommended that patient call 911 or go to the local ED should there be a change in any of these risk factors.        DSM 5 DIAGNOSIS:   300.02 (F41.1) Generalized Anxiety Disorder  300.3 (F42) Obsessive Compulsive Disorder     Attention-Deficit/Hyperactivity Disorder  314.01 (F90.2) Combined presentation      MEDICAL COMORBIDITY IMPACTING CLINICAL PICTURE: None noted.       ASSESSMENT AND PLAN      Problem List as of 7/15/2022 Reviewed: 2022  3:22 PM by Meredith Dejesus APRN CNP          Noted       Behavioral    Last System Assessment & Plan 7/15/2022 Virtual Visit Written 2022  1:41 PM by Meredith Dejesus APRN CNP     Tolerated the transition off the  Abilify (which was stabilizing but with 70 lb weight gain) and onto Rexulti.  Currently at low dose of 1 mg.  Will increase to 2 mg.  He will see MAT Suggs, 2nd week of August to reevaluate.  Then he will see me the week he leaves back to college in [place].  He will plan to continue to work with this provider during college breaks and he is in [place].  Overall doing well, but anxiety and rumination returning which is what the Abilify was targeting.             1. Moderate episode of recurrent major depressive disorder (H) - Primary 12/24/2021     Relevant Medications     brexpiprazole (REXULTI) 2 MG tablet           CONSULTS/REFERRALS:   Continue therapy   Coordinate care with therapist as needed     MEDICAL:   Metabolic labs due 6/2023.  Last completed 6/14/2022 of lipids and fasting glucose  Coordinate care with PCP (Davidson Perez) as needed  Follow up with primary care provider as planned or for acute medical concerns.     PSYCHOEDUCATION:  Medication side effects and alternatives reviewed. Health promotion activities recommended and reviewed today. All questions addressed. Education and counseling completed regarding risks and benefits of medications and psychotherapy options.  Consent provided by patient/guardian  Call the psychiatric nurse line with medication questions or concerns at [phone].  Yoyo may be used to communicate with your provider, but this is not intended to be used for emergencies.  FIRST GENERATION ANTIPSYCHOTIC/ SECOND GENERATION ANTIPSYCHOTIC USE:  Atypical need for cardiometabolic monitoring with medication- B/P, weight, blood sugar, cholesterol.  Need to monitor for abnormal movements taught  Medlineplus.gov is information for patients.  It is run by the [employer] and it contains information about all disorders, diseases and all medications.       COMMUNITY RESOURCES:    CRISIS NUMBERS: Provided in AVS 6/17/2022  National Suicide Prevention Lifeline:  1-752-136-TALK (587-585-3579)  [employer]/resources for a list of additional resources (SOS)            [facility] - [phone]   Urgent Care Adult Mental Acqywg-847-368-7900 mobile unit/  crisis line  [facility] -[phone]   COPE  [place] Mobile Team -[phone] (adults)/ [phone] (child)  Poison Control Center - 1-638.382.1669    OR  go to nearest ER  Crisis Text Line for any crisis  send this-   To: 243146   [facility] ([facility]) [facility]  [phone]  National Suicide Prevention Lifeline: 367.614.6471 (TTY: 159.221.4419). Call anytime for help.  (www.suicidepreventionlifeline.org)  National [place] [employer] Mental Illness (www.kaylie.org): 206.475.5024 or 005-218-0478.   Mental Health Association (www.mentalhealth.org): 999.647.3739 or 482-421-0300.  [place] Crisis Text Line: Text [place] to 087644  Suicide LifeLine Chat: suicideiHealthHomeline.org/chat      ADMINISTRATIVE BILLIN minutes spent interviewing patient, reviewing referral documents, obtaining and reviewing outside records, communication with other health specialists, and preparing this report on today's date    Video/Phone Start Time: 3:05  Video/Phone End Time: 3:28    Patient Status:  Patient will continue to be seen for ongoing consultation and stabilization.    Signed:   Meredith Dejesus, MSN, APRN, FMHNP-[facility] Psychiatry Service (CCPS)   Chart documentation done in part with Dragon Voice Recognition software.  Although reviewed after completion, some word and grammatical errors may remain.

## 2022-07-15 NOTE — Clinical Note
Patient will see Shila Ruelas while I am on vacation.  Then can you also please schedule with me the end of August before he leaves back to college in [place].    Thank you!   Meredith Dejesus DNP, APRN, FMHNP-BC,

## 2022-07-21 ENCOUNTER — TELEPHONE (OUTPATIENT)
Dept: BEHAVIORAL HEALTH | Facility: CLINIC | Age: 22
End: 2022-07-21

## 2022-07-21 NOTE — TELEPHONE ENCOUNTER
Patient called TC queue back. Stated he'd like appointment 8/10 @4pm with Shila Ruelas. Patient scheduled.    Nupur Castillo  Transition Clinic Coordinator  Date and Time: 07/21/22 1:57 PM

## 2022-07-21 NOTE — TELEPHONE ENCOUNTER
Per request of provider Shila Ruelas, reached out to patient to schedule TC return appointment. Left voicemail asking for call back to TC.    Per Shila Ruelas patient should be scheduled 8/8/2022 at 4pm or 8/10/2022 at 4pm as a coverage appointment since established provider Meredith Dejesus is not available.    Nupur Castillo  Transition Clinic Coordinator  Date and Time: 07/21/22 11:22 AM

## 2022-07-24 NOTE — ASSESSMENT & PLAN NOTE
Tolerated the transition off the Abilify (which was stabilizing but with 70 lb weight gain) and onto Rexulti.  Currently at low dose of 1 mg.  Will increase to 2 mg.  He will see MAT Suggs, 2nd week of August to reevaluate.  Then he will see me the week he leaves back to college in [place].  He will plan to continue to work with this provider during college breaks and he is in [place].  Overall doing well, but anxiety and rumination returning which is what the Abilify was targeting.

## 2022-07-27 ENCOUNTER — OFFICE VISIT (OUTPATIENT)
Dept: FAMILY MEDICINE | Facility: CLINIC | Age: 22
End: 2022-07-27
Payer: COMMERCIAL

## 2022-07-27 VITALS
OXYGEN SATURATION: 97 % | SYSTOLIC BLOOD PRESSURE: 143 MMHG | WEIGHT: 260.2 LBS | RESPIRATION RATE: 20 BRPM | BODY MASS INDEX: 38.99 KG/M2 | HEART RATE: 82 BPM | DIASTOLIC BLOOD PRESSURE: 84 MMHG | TEMPERATURE: 97.7 F

## 2022-07-27 DIAGNOSIS — R03.0 ELEVATED BLOOD PRESSURE READING WITHOUT DIAGNOSIS OF HYPERTENSION: ICD-10-CM

## 2022-07-27 DIAGNOSIS — R20.2 PARESTHESIA: ICD-10-CM

## 2022-07-27 DIAGNOSIS — M25.512 ACUTE PAIN OF LEFT SHOULDER: Primary | ICD-10-CM

## 2022-07-27 PROCEDURE — 99213 OFFICE O/P EST LOW 20 MIN: CPT | Performed by: PHYSICIAN ASSISTANT

## 2022-07-27 RX ORDER — CYCLOBENZAPRINE HCL 10 MG
5-10 TABLET ORAL
Qty: 20 TABLET | Refills: 0 | Status: SHIPPED | OUTPATIENT
Start: 2022-07-27 | End: 2023-08-30

## 2022-07-27 ASSESSMENT — PATIENT HEALTH QUESTIONNAIRE - PHQ9
10. IF YOU CHECKED OFF ANY PROBLEMS, HOW DIFFICULT HAVE THESE PROBLEMS MADE IT FOR YOU TO DO YOUR WORK, TAKE CARE OF THINGS AT HOME, OR GET ALONG WITH OTHER PEOPLE: VERY DIFFICULT
SUM OF ALL RESPONSES TO PHQ QUESTIONS 1-9: 3
SUM OF ALL RESPONSES TO PHQ QUESTIONS 1-9: 3

## 2022-07-27 ASSESSMENT — ASTHMA QUESTIONNAIRES: ACT_TOTALSCORE: 25

## 2022-07-27 ASSESSMENT — PAIN SCALES - GENERAL: PAINLEVEL: NO PAIN (0)

## 2022-07-27 NOTE — LETTER
July 27, 2022      Jarad Nolasco  [id number]  [place] [address]        To Whom It May Concern:    Jarad Nolasco was seen in our clinic. He may return to work with the following restrictions: no repetitive use of left arm for the next week. Please find duties under the above restrictions or excuse him from work.      Sincerely,        NICOLETTE Cole

## 2022-07-27 NOTE — PATIENT INSTRUCTIONS
Adela Abraham,    Thank you for allowing [facility] to manage your care.    This is likely rotator cuff tendinitis with possible carpal tunnel. Where the splint half of the day/night and hopefully this will improve the tingling.    If you develop worsening/changing symptoms at any time, please call 911 or go to the emergency department for evaluation.    I sent your prescriptions to your pharmacy.    For your pain, please use Tylenol 650mg every 6 hours. You may use 400mg of ibuprofen between doses of Tylenol.     Max acetaminophen (Tylenol) 4,000mg/24 hours  Max ibuprofen 3,000mg/24 hours    For severe pain not controlled by over the counter medications, please use cyclobenzaprine as prescribed. Do not use this medication while driving, operating machinery, with other sedating medications, or while drinking alcohol as it will make you drowsy.    Your blood pressure was high today. Get a blood pressure cuff for use at home. Take and record your blood pressure daily for 2 weeks. If your blood pressure is greater than or equal to 140/90mm Hg more than half of the time, please schedule an appointment with us for a recheck.    If you have any questions or concerns, please feel free to call us at [phone]    Sincerely,    Jose E Block PA-C    Did you know?      You can schedule a video visit for follow-up appointments as well as future appointments for certain conditions.  Please see the below link.     [email or web address]    If you have not already done so,  I encourage you to sign up for [employer] ([email or web address]).  This will allow you to review your results, securely communicate with a provider, and schedule virtual visits as well.
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
## 2022-07-28 ENCOUNTER — TELEPHONE (OUTPATIENT)
Dept: BEHAVIORAL HEALTH | Facility: CLINIC | Age: 22
End: 2022-07-28

## 2022-07-28 NOTE — TELEPHONE ENCOUNTER
Per request of Shila Ruelas, reached out to patient to reschedule appointment to 8/12/2022 since there is a lot of openings that day. Left voicemail requesting call back to TC queue.     Nupur Castillo  Transition Clinic Coordinator  Date and Time: 07/28/22 3:09 PM

## 2022-08-01 NOTE — TELEPHONE ENCOUNTER
Patient called TC queue to return call from previous week. Coordinator stated that per direction of provider Shila Ruelas we can keep appointment as is and no longer need to reschedule.  Thanked patient for calling back.    Shila Ruelas stated in message to coordinators that patient is a [facility] patient and I gave the green light on seeing a [facility] patient as a one time exception to current protocol. And let's just keep it as it is.    Nupur Castillo  Transition Clinic Coordinator  Date and Time: 08/01/22 2:05 PM

## 2022-08-10 ENCOUNTER — VIRTUAL VISIT (OUTPATIENT)
Dept: BEHAVIORAL HEALTH | Facility: CLINIC | Age: 22
End: 2022-08-10
Payer: COMMERCIAL

## 2022-08-10 DIAGNOSIS — F90.2 ATTENTION DEFICIT HYPERACTIVITY DISORDER (ADHD), COMBINED TYPE: ICD-10-CM

## 2022-08-10 DIAGNOSIS — F42.2 MIXED OBSESSIONAL THOUGHTS AND ACTS: ICD-10-CM

## 2022-08-10 DIAGNOSIS — F33.9 MAJOR DEPRESSIVE DISORDER, RECURRENT EPISODE WITH ANXIOUS DISTRESS (H): Primary | ICD-10-CM

## 2022-08-10 DIAGNOSIS — F41.1 GAD (GENERALIZED ANXIETY DISORDER): ICD-10-CM

## 2022-08-10 DIAGNOSIS — F51.01 PRIMARY INSOMNIA: ICD-10-CM

## 2022-08-10 PROCEDURE — 99214 OFFICE O/P EST MOD 30 MIN: CPT | Mod: GT | Performed by: NURSE PRACTITIONER

## 2022-08-10 RX ORDER — TRAZODONE HYDROCHLORIDE 50 MG/1
50 TABLET, FILM COATED ORAL AT BEDTIME
Qty: 90 TABLET | Refills: 0 | Status: SHIPPED | OUTPATIENT
Start: 2022-08-10 | End: 2023-08-30

## 2022-08-10 RX ORDER — LISDEXAMFETAMINE DIMESYLATE 50 MG/1
50 CAPSULE ORAL EVERY MORNING
Refills: 0 | COMMUNITY
Start: 2022-08-10 | End: 2023-08-30

## 2022-08-10 ASSESSMENT — ANXIETY QUESTIONNAIRES
8. IF YOU CHECKED OFF ANY PROBLEMS, HOW DIFFICULT HAVE THESE MADE IT FOR YOU TO DO YOUR WORK, TAKE CARE OF THINGS AT HOME, OR GET ALONG WITH OTHER PEOPLE?: SOMEWHAT DIFFICULT
1. FEELING NERVOUS, ANXIOUS, OR ON EDGE: SEVERAL DAYS
5. BEING SO RESTLESS THAT IT IS HARD TO SIT STILL: SEVERAL DAYS
GAD7 TOTAL SCORE: 5
6. BECOMING EASILY ANNOYED OR IRRITABLE: NOT AT ALL
7. FEELING AFRAID AS IF SOMETHING AWFUL MIGHT HAPPEN: NOT AT ALL
GAD7 TOTAL SCORE: 5
GAD7 TOTAL SCORE: 5
3. WORRYING TOO MUCH ABOUT DIFFERENT THINGS: SEVERAL DAYS
7. FEELING AFRAID AS IF SOMETHING AWFUL MIGHT HAPPEN: NOT AT ALL
IF YOU CHECKED OFF ANY PROBLEMS ON THIS QUESTIONNAIRE, HOW DIFFICULT HAVE THESE PROBLEMS MADE IT FOR YOU TO DO YOUR WORK, TAKE CARE OF THINGS AT HOME, OR GET ALONG WITH OTHER PEOPLE: SOMEWHAT DIFFICULT
4. TROUBLE RELAXING: SEVERAL DAYS
2. NOT BEING ABLE TO STOP OR CONTROL WORRYING: SEVERAL DAYS

## 2022-08-10 ASSESSMENT — PATIENT HEALTH QUESTIONNAIRE - PHQ9
10. IF YOU CHECKED OFF ANY PROBLEMS, HOW DIFFICULT HAVE THESE PROBLEMS MADE IT FOR YOU TO DO YOUR WORK, TAKE CARE OF THINGS AT HOME, OR GET ALONG WITH OTHER PEOPLE: SOMEWHAT DIFFICULT
SUM OF ALL RESPONSES TO PHQ QUESTIONS 1-9: 6
SUM OF ALL RESPONSES TO PHQ QUESTIONS 1-9: 6

## 2022-08-10 NOTE — PROGRESS NOTES
[facility]      Mental Health & Addiction Service Line    Transition Clinic: Psychiatry Progress Note  Medication Management                Charts/documentation read prior to the appointment:  -2022    -7/15/2022          VISIT INFORMATION      Date:  August 10, 2022       Number:  -Initial      Referral source:  -[facility]      Patient Identifying Information:  Legal name: Jarad Nolasco  Preferred name: Jarad  : 2000        Participants:   -Patient  -Provider        Telehealth visit details:  Type of service:   Video  Patient location:   At home  Provider Location: [facility]  Platform utilized: [employer]    Start time: 4:13 pm  End time:  4:28 pm          INTERIM HX      -Working installing solar panels  -So far like the job and it pays pretty well  -Will be going back to college in the           PSYCHIATRIC ROS      Sleep:  -Really good  -Getting 8 or 9 hours   -Usually fall asleep easily if waking up in the night        Mood/Anxiety:  -See PHQ-9 score    -See FLORINDA-7 score    -Continue to have some anxiety, difficulty letting things go: racing thoughts, feel overwhelmed even though they have decreased on the Rexulti    -Tend to eat as a coping mechanism if I am really anxious         Suicidal ideations:  -Denies passive or active thoughts at this time        SIB:  -Denies engaging in self harm         Side effects:  -Haven't lost any weight since changing from Abilify to Rexulti but appetite has gone down    -Also, I think part of the reason I haven't lost weight is because I'm doing physical labor and have built up some muscle mass/am more in shape than when I was using the Abilify        PSYCHIATRIC HISTORY    Suicide attempts:  -None reported    Inpatient hospitalizations:  -None reported     ECT:  -None reported      Medication Trials:    SSRIs:  -Prozac: plateau    Antipsychotics:  -Seroquel  -Zyprexa          MEDICAL  HISTORY    -The problem list was reviewed via the EMR prior to the appointment    -The patient denies any concerning physical and or medical symptoms during the interviewing process          MEDICATIONS      Current Outpatient Medications:      brexpiprazole (REXULTI) 2 MG tablet, Take 1 tablet (2 mg) by mouth daily, Disp: 30 tablet, Rfl: 1     cloNIDine (CATAPRES) 0.2 MG tablet, Take 0.2 mg by mouth nightly as needed, Disp: , Rfl:      cyclobenzaprine (FLEXERIL) 10 MG tablet, Take 0.5-1 tablets (5-10 mg) by mouth nightly as needed for muscle spasms, Disp: 20 tablet, Rfl: 0     traZODone (DESYREL) 50 MG tablet, Take 1 tablet (50 mg) by mouth At Bedtime, Disp: 90 tablet, Rfl: 3     venlafaxine (EFFEXOR XR) 150 MG 24 hr capsule, Take 1 capsule (150 mg) by mouth daily, Disp: 30 capsule, Rfl: 0     albuterol (PROAIR HFA/PROVENTIL HFA/VENTOLIN HFA) 108 (90 Base) MCG/ACT inhaler, INHALE 1 TO 2 PUFFS INTO THE LUNGS EVERY 4 HOURS AS NEEDED FOR SHORTNESS OF BREATH OR DIFFICULT BREATHING OR WHEEZING, Disp: 8.5 g, Rfl: 0     brexpiprazole (REXULTI) 1 MG tablet, Take 1 tablet (1 mg) by mouth daily 1/2 tablet daily for one week then increase to 1 tablet thereafter., Disp: 30 tablet, Rfl: 0     EPINEPHrine (ANY BX GENERIC EQUIV) 0.3 MG/0.3ML injection 2-pack, INJECT INTRAMUSCULARLY ONCE AS DIRECTED FOR ANAPHYLAXIS, Disp: 2 each, Rfl: 0      If a controlled substance is prescribed during today's appointment:    -The [place] Prescription Monitoring Program has been reviewed and there are no current concerns with: diversionary activity, early refill requests, and or obtaining the medication from multiple providers.        VITALS    BP Readings from Last 3 Encounters:   07/27/22 (!) 143/84   06/14/22 (!) 128/96   06/20/19 132/83       Pulse Readings from Last 3 Encounters:   07/27/22 82   06/14/22 76   06/20/19 78       Wt Readings from Last 3 Encounters:   07/27/22 118 kg (260 lb 3.2 oz)   06/14/22 111.5 kg (245 lb 12.8 oz)    06/20/19 109.3 kg (241 lb) (99 %, Z= 2.26)*     * Growth percentiles are based on [facility] (Boys, 2-20 Years) data.         LABS    The following labs were reviewed prior to or during the appointment:  -6/14/2022        SCALES    PHQ 6/14/2022 7/27/2022 8/10/2022   PHQ-9 Total Score 6 3 6   Q9: Thoughts of better off dead/self-harm past 2 weeks Not at all Not at all Not at all   F/U: Thoughts of suicide or self-harm - - -   F/U: Safety concerns - - -        FLORINDA-7 SCORE 6/17/2022 6/17/2022 8/10/2022   Total Score - 15 (severe anxiety) 5 (mild anxiety)   Total Score 15 15 5       Answers for HPI/ROS submitted by the patient on 8/10/2022  If you checked off any problems, how difficult have these problems made it for you to do your work, take care of things at home, or get along with other people?: Somewhat difficult  PHQ9 TOTAL SCORE: 6  FLORINDA 7 TOTAL SCORE: 5        MENTAL STATUS EXAMINATION    Appearance: Well Groomed, Attire Appropriate for the Season  General Behavior:  Cooperative, Direct Eye Contact  Speech: Mildly excessive  Musculoskeletal:    -Gait not observed during t.h. visit  -No facial tics/tremors observed   -Motor coordination is grossly intact   Mood: Pretty good but anxious  Affect: Appropriate to Content of Speech and Circumstances  Attention: Intact  Orientation:  Person, Place, Time, Situation  Thought Associations:  Intact  Thought Content: Reality based   Thought Processes: Organized, Normal rate  Memory: Recent and remote memory intact; no screenings or formal testing performed   Language: Intact  Judgement: Fair to Good  Insight: Fair to Good        ASSESSMENT/CLINICAL IMPRESSIONS    Summary:    Jarad Nolasco is a 20 y/o male with a history of: MDD, FLORINDA, OCD, and ADHD combined type.    Psychotropics have been previously been managed by [facility] provider:  PASQUALE Dejesus, MSN, APRN, FMHNP-BC on 6/17 and 7/15/2022.        Is attending today's appointment for a 1x check in while above clinician is out of the  office.    Ilia matt is doing reasonably well and he is forward thinking about continuing to work the remainder of the summer months and return to school/college in [place] for the 2022-23 academic school year.    Rates residual FLORINDA and OCD thoughts as still minorly impairing functioning and would like to maximize Rexulti dosing.   Hasn't lost any weight since cross taper off Abilify however does note appetite is decreased.    At follow up with [facility] on 8/19/2022 will discuss if modification of Effexor is needed/appropriate and or consideration of a cross taper onto another serotonergic agent.    He denies any immediate safety concerns towards self or others and is able to commit to safety during the interview.        DSM-V and or working diagnosis:    1. Major depressive disorder, recurrent episode with anxious distress (H)    2. FLORINDA    3. Mixed obsessional thoughts and acts    4. Attention deficit hyperactivity disorder (ADHD), combined type    5. Primary insomnia           SAFETY EVALUATION:    Suicidal ideations:  -denies  Homicidal ideations:  -denies  Protective and mitigating factors:  -parents, friends  -no prior attempts  -involvement in outpatient mental health services  Risk factors:  -male, age  -ongoing mental health symptoms  Risk assessment:  -low to medium            TREATMENT PLAN      Medications:  Start:  Brexpiprazole/Rexulti 3 mg daily; increased from 2 mg    Continue:  Clonidine/Catapress . 2 mg as needed at bedtime    Lisdexamfetamine/Vyvanse 50 mg;  did not need refill    Venlafaxine/Effexor  mg daily    Trazodone/Desyrel 50 mg at bedtime          Labs:  -None obtained          Therapy:  -As needed        Non-pharmacological modalities:  -Continue to try and eat larger meals in AM or mid-day versus right before bedtime        Return to Clinic or Referrals:  -You do not need to return to the Transition Clinic for medication management    -Please make sure to keep the appointment [facility]:   [facility], APRN, FMHNP-BC                Total time: 30 minutes per:    -Review of EMR  -Appointment time   -Documentation          Shila SHANKS, [facility]    --------------------------------------------------------------------------------------------------------------------------        TREATMENT RISK STATEMENT    The risks, benefits, alternatives, and potential adverse effects have been explained and are understood by the patient.  The patient agrees to the treatment plan with their ability to do so.      The patient knows to call the clinic: [phone]  for any problems or concerns until the next psychiatry visit, regardless if it is within or outside of the [facility] system.     If unable to reach clinic staff (via phone call or medical messaging) during the normal business hours: 8:00 am to 4:30 pm then it is recommended accessing the nearest: emergency department, urgent care facility, or utilizing local (varies based on county of residence) and national crisis #'s or text messaging services for immediate assistance.          --------------------------------------------------------------------------------------------------------------------------        If applicable the following has been discussed with the patient, parent/guardian, and or attending family member during the appointment:      1. Risks of polypharmacy and possible drug interactions with current medication list + common OTC products, herbs, and supplements.    Moving forward, it is suggested to intermittently check-in with a clinic or retail pharmacist whenever new medications or OTC/h/s are consumed.    2. Recommendation to adhere to CDC guidelines as it relates alcohol consumption.  If taking benzodiazepines, you should abstain from alcohol intake due to increased risks of CNS and respiratory depression, as well as psychomotor impairment.    3. If possible, it is recommended to avoid concurrent  use of prescribed:  opioids  +  benzodiazepines due to increased risks of CNS and respiratory depression, as well as the increased risk of overdose.     4. Recommendation to minimize and or abstain from THC use (unless the pt. is prescribed medical marijuana).    5. Recommendation to abstain from illicit substances including but not limited to the following: heroin, street fentanyl, cocaine, methamphetamines, and bath salts.    6. Do not take opioids, stimulants, and or other prescription medications unless they are specifically prescribed for you.    7. Recommendation to abstain from tobacco/smoking, alcohol, THC, and all illicit substances if trying to become or are pregnant.    8. Black Box Warnings associated with the prescribed psychotropic(s).    9. Potential adverse effects of antipsychotics including but not limited to the following: weight gain, metabolic syndrome, EPS/Tardive Dyskinesias.    10. Potential CV and neurological adverse effects of stimulants including but not limited to the following:  sudden death, MI, stroke, HTN, cardiomyopathy (long term use) as well as seizures.

## 2022-08-10 NOTE — PATIENT INSTRUCTIONS
-You do not need to return to the Transition Clinic for medication management  -Please make sure to keep the appointment on:  8/19/2022 with [facility] provider PASQUALE Dejesus MSN, APRN, FMHNP-BC      -----------------------------------    Start:  Brexpiprazole/Rexulti 3 mg daily; increased from 2 mg    Continue:  Clonidine/Catapress . 2 mg as needed at bedtime    Lisdexamfetamine/Vyvanse 50 mg;  did not need refill    Venlafaxine/Effexor  mg daily    Trazodone/Desyrel 50 mg at bedtime

## 2022-08-10 NOTE — PROGRESS NOTES
" This video/telephone visit will be conducted virtually between you and your provider. We have found that certain health care needs can be provided without the need for an in-person physical exam. This service lets us provide the care you need with a video /telephone conversation. If a prescription is necessary we can send it directly to your pharmacy. If lab work is needed we can place an order for that and you can then stop by our lab to have the test done at a later time.\"   Just as we bill insurance for in-person visits, we also bill insurance for video/telephone visits. If you have questions about your insurance coverage, we recommend that you speak with your insurance company.      Patient/Parent has given verbal consent for video/Telephone visit? yes    Patient would like the video visit invitation sent by: [email or web address]  Patient verified allergies, medications and pharmacy via phone. Patient states they are ready for visit.      Mental Health &Addiction (MH&A)Transition Clinic (TC):     Provides Patient Support While Waiting to Access Programmatic and Outpatient MH&A Care and Provides [facility] Crisis Assessment Services     INTAKE  ____________________________________________________    \"The Transition Clinic is a temporary psychiatry service that helps to bridge the time to your next appointment. It is not intended to be a long-term service and you are expected to attend your scheduled appointment with your next provider.\"  [x] Patient/Parent verbalized understanding    If you need support between appointments, please call [phone] and let them know you're seen by Transition Clinic Psychiatry. You may also send a [employer] message to reach us.    General-     Most pressing MH needs at this time: Anxiety and wants to talk about increasing his meds.  This is  a coverage appointment since established provider Meredtih Dejesus is not available.      Any physical health conditions or diagnoses we should be aware of or " that are impacting you: denies      Medications-     Injectable medications currently prescribed: None  If yes, do you need an appointment for the next injection: NA    Any Controlled Substances that you are prescribed: No      Primary care provider: Davidson Perez PA-C        FLORINDA-7 scores:  5  FLORINDA-7 SCORE 6/17/2022 6/17/2022   Total Score - 15 (severe anxiety)   Total Score 15 15       PHQ-9 scores: 6  PHQ-9 SCORE 6/14/2022 7/27/2022   PHQ-9 Total Score MyChart 6 (Mild depression) 3 (Minimal depression)   PHQ-9 Total Score 6 3           Anything the provider should be aware of for today's appointment: As per RN, one time eval approved by provider    New (awaiting) Mental health provider or next programming: already established with PASQUALE Dejesus    Date of scheduled apt: 8/19/22        Opal Krishna on August 10, 2022 at 3:45 PM     MH&A TC   NURSING Post-Appointment Chart-check:    Correct pharmacy verified with patient and updated in chart? [x] yes []no    Medications ordered this visit were e-scribed.  Verified by order class [x] yes  [] no    List Medications:    brexpiprazole (REXULTI) 3 MG tablet  traZODone (DESYREL) 50 MG tablet  Class: E-Prescribe      Medication changes or discontinuations were communicated to patient's pharmacy: [x] yes  [] no  Called [facility] and   UA collected [] yes  [x] no  [] n/a-virtual     Future appointment was made: [] yes  [x] no  [] n/a    Dictation completed at time of chart check: [] yes  [x] no    I have checked the documentation for today s encounters and the above information has been reviewed and completed.      Opal Krishna on August 12, 2022 at 3:43 PM

## 2022-08-10 NOTE — Clinical Note
Thanks for the referral :-)  Let me know if there are any questions or concerns related to the progress notes.  Shila

## 2022-08-18 NOTE — PROGRESS NOTES
"[facility]  Aug 19, 2022      Behavioral Health Clinician Progress Note    Patient Name: Jarad Nolasco \"Ilia\"          Service Type:  Individual      Service Location:   [employer] / Email (patient reached)     Session Start Time: 200pm  Session End Time: 206pm      Session Length: 6 min     Attendees: Patient     Service Modality:  Video Visit:      Provider verified identity through the following two step process.  Patient provided:  Patient     Telemedicine Visit: The patient's condition can be safely assessed and treated via synchronous audio and visual telemedicine encounter.      Reason for Telemedicine Visit: Services only offered telehealth    Originating Site (Patient Location): Patient's home    Distant Site (Provider Location): Provider Remote Setting- Home Office    Consent:  The patient/guardian has verbally consented to: the potential risks and benefits of telemedicine (video visit) versus in person care; bill my insurance or make self-payment for services provided; and responsibility for payment of non-covered services.     Patient would like the video invitation sent by:  My Chart    Mode of Communication:  Video Conference via [employer]    As the provider I attest to compliance with applicable laws and regulations related to telemedicine.    Visit Activities (Refresh list every visit): [facility] Only    Diagnostic Assessment Date: 2022  Treatment Plan Review Date: 10/15/2022  See Flowsheets for today's PHQ-9 and FLORINDA-7 results  Previous PHQ-9:   PHQ-9 SCORE 2022 2022 8/10/2022   PHQ-9 Total Score MyChart 6 (Mild depression) 3 (Minimal depression) 6 (Mild depression)   PHQ-9 Total Score 6 3 -   Some encounter information is confidential and restricted. Go to Review Flowsheets activity to see all data.     Previous FLORINDA-7:   FLORINDA-7 SCORE 2022 2022 8/10/2022   Total Score - 15 (severe anxiety) 5 (mild anxiety)   Total Score 15 15 -   Some encounter " information is confidential and restricted. Go to Review Flowsheets activity to see all data.       TRI LEVEL:  No flowsheet data found.    DATA  Extended Session (60+ minutes): No  Interactive Complexity: No  Crisis: No  [facility] Patient: No    Treatment Objective(s) Addressed in This Session:  Target Behavior(s): decrease anxiety    Anxiety: will develop more effective coping skills to manage anxiety symptoms    Current Stressors / Issues:  MH update: Pt reports that things have been going well but he couldn't get his rexulti filled because the pharmacy didn't have it. He's able to pick them up today. He returns to school in [place] next week. Mixed feelings about graduating in the Spring. He will be working on finding his career path this year. Increased stress related to returning to school may have increased his anxiety. He's albe to recognize this and use constructive self talk to manage. Continues to feel that his attention is well managed. Pt reports that he will be able to be seen at college for medication and counseling. He's already reached out to them about getting some appointments.   Stresses: returning to school, preparing to move   Appetite: unremarkable  Sleep: unremarkable  Therapy: no  ZENY: No  Preg: NA  Most important: update on medication. Other provider increased his rexulti.    Progress on Treatment Objective(s) / Homework:  No improvement - ACTION (Actively working towards change); Intervened by reinforcing change plan / affirming steps taken    Motivational Interviewing    MI Intervention: Co-Developed Goal: improve anxiety, Expressed Empathy/Understanding, Open-ended questions, Reflections: simple and complex, Change talk (evoked) and Reframe     Change Talk Expressed by the Patient: Desire to change Ability to change Reasons to change Need to change Committment to change Activation Taking steps    Provider Response to Change Talk: E - Evoked more info from patient about behavior change, A -  Affirmed patient's thoughts, decisions, or attempts at behavior change, R - Reflected patient's change talk and S - Summarized patient's change talk statements      Care Plan review completed: Yes    Medication Review:  No changes to current psychiatric medication(s)    Medication Compliance:  Yes    Changes in Health Issues:   None reported    Chemical Use Review:   Substance Use: Chemical use reviewed, no active concerns identified      Tobacco Use: No current tobacco use.      Assessment: Current Emotional / Mental Status (status of significant symptoms):  Risk status (Self / Other harm or suicidal ideation)  Patient denies a history of suicidal ideation, suicide attempts, self-injurious behavior, homicidal ideation, homicidal behavior and and other safety concerns  Patient denies current fears or concerns for personal safety.  Patient denies current or recent suicidal ideation or behaviors.  Patient denies current or recent homicidal ideation or behaviors.  Patient denies current or recent self injurious behavior or ideation.  Patient denies other safety concerns.  A safety and risk management plan has not been developed at this time, however patient was encouraged to call [place] [phone] should there be a change in any of these risk factors.    Appearance:   Appropriate   Eye Contact:   Good   Psychomotor Behavior: Normal   Attitude:   Cooperative   Orientation:   All  Speech   Rate / Production: Normal    Volume:  Normal   Mood:    Anxious   Affect:    Appropriate   Thought Content:  Clear   Thought Form:  Coherent  Logical   Insight:    Good     Diagnoses:  1. FLORINDA (generalized anxiety disorder)    2. Attention deficit hyperactivity disorder (ADHD), combined type        Collateral Reports Completed:  Collaborated with Meredith Dejesus, MSN, APRN, CNP, FMHNP-BC, Nathaniel CCPS    Plan: (Homework, other):  Patient was given information about behavioral services and encouraged to schedule a follow up appointment  with the clinic [facility] [place] in 1 month.  He was also given information about mental health symptoms and treatment options .  CD Recommendations: No indications of CD issues. Roselyn Brenda [facility]         ______________________________________________________________________    Integrated Primary Care Behavioral Health Treatment Plan    Patient's Name: Jarad Nolasco  YOB: 2000    Date of Creation: 7/15/2022  Date Treatment Plan Last Reviewed/Revised: NA    DSM5 Diagnoses: 300.02 (F41.1) Generalized Anxiety Disorder  Psychosocial / Contextual Factors: none  PROMIS (reviewed every 90 days): 23    Referral / Collaboration:  Collaborated with Meredith Dejesus, MSN, APRN, CNP, HNP-BC, Nathaniel CCPS.    Anticipated number of session for this episode of care: 10-12  Anticipation frequency of session: Monthly  Anticipated Duration of each session: 16-37 minutes  Treatment plan will be reviewed in 90 days or when goals have been changed.       MeasurableTreatment Goal(s) related to diagnosis / functional impairment(s)  Goal 1: Patient will experience a reduction in anxiety    I will know I've met my goal when don't feel as panicky, not have racing thoughts, less irritability.      Objective #A (Patient Action)    Patient will use at least 4 coping skills for anxiety management in the next 4 weeks.  Status: Continued - Date(s): 10/15/2022    Intervention(s)  Therapist will teach emotional regulation skills. to manage anxiety.      Patient has reviewed and agreed to the above plan.      Roselyn Gutierrez, [facility]  Aug 19, 2022

## 2022-08-19 ENCOUNTER — VIRTUAL VISIT (OUTPATIENT)
Dept: PSYCHIATRY | Facility: CLINIC | Age: 22
End: 2022-08-19
Payer: COMMERCIAL

## 2022-08-19 ENCOUNTER — VIRTUAL VISIT (OUTPATIENT)
Dept: BEHAVIORAL HEALTH | Facility: CLINIC | Age: 22
End: 2022-08-19
Payer: COMMERCIAL

## 2022-08-19 DIAGNOSIS — Z79.899 HIGH RISK MEDICATION USE: ICD-10-CM

## 2022-08-19 DIAGNOSIS — F42.8 OTHER OBSESSIVE-COMPULSIVE DISORDERS: ICD-10-CM

## 2022-08-19 DIAGNOSIS — F90.2 ATTENTION DEFICIT HYPERACTIVITY DISORDER (ADHD), COMBINED TYPE: ICD-10-CM

## 2022-08-19 DIAGNOSIS — F41.1 GAD (GENERALIZED ANXIETY DISORDER): ICD-10-CM

## 2022-08-19 DIAGNOSIS — F33.1 MODERATE EPISODE OF RECURRENT MAJOR DEPRESSIVE DISORDER (H): Primary | ICD-10-CM

## 2022-08-19 DIAGNOSIS — F41.1 GAD (GENERALIZED ANXIETY DISORDER): Primary | ICD-10-CM

## 2022-08-19 PROCEDURE — 99214 OFFICE O/P EST MOD 30 MIN: CPT | Mod: GT | Performed by: NURSE PRACTITIONER

## 2022-08-19 PROCEDURE — 99207 PR NO BILLABLE SERVICE THIS VISIT: CPT | Performed by: SOCIAL WORKER

## 2022-08-19 RX ORDER — VENLAFAXINE HYDROCHLORIDE 150 MG/1
150 CAPSULE, EXTENDED RELEASE ORAL DAILY
Qty: 90 CAPSULE | Refills: 0 | Status: SHIPPED | OUTPATIENT
Start: 2022-08-19 | End: 2023-08-30

## 2022-08-19 NOTE — ASSESSMENT & PLAN NOTE
Tolerating the Rexulti.  Has not picked up the 3 mg but will start in the next few days.  Denies side effects.  Will continue the current plan and follow-up over Thanksgiving when he is home for break.    MEDICATIONS:   - continue the Rexulti at 3 mg, Venlafaxine  mg, trazodone and the Vyvanse 60 mg.  One script of the Vyvanse ordered when seen by Shila Ruelas and on file.  He will have further scripts for stimulant obtained from provider in [place].       FOLLOW UP: Schedule an appointment with me in over Thanksgiving break or sooner as needed.

## 2022-08-19 NOTE — PATIENT INSTRUCTIONS
**For crisis resources, please see the information at the end of this document**     Thank you for coming to the [facility] [place] [facility].    TREATMENT PLAN:    MEDICATIONS:   - continue the Rexulti at 3 mg, Venlafaxine  mg, trazodone and the Vyvanse 60 mg.  One script of the Vyvanse ordered when seen by Shila Ruelas and on file.  He will have further scripts for stimulant obtained from provider in [place].       FOLLOW UP: Schedule an appointment with me in over Thanksgiving break or sooner as needed.  The intake team should be calling you to schedule.  If you dont hear from them, or they were unable to reach you, please call [phone] to schedule.  Follow up with primary care provider as planned or for acute medical concerns.  Call the psychiatric nurse line with medication questions or concerns at [phone].      Medication Refills:  If you need any refills please call your pharmacy and they will contact us. Our fax number for refills is [phone]. Please allow three business for refill processing. If you need to  your refill at a new pharmacy, please contact the new pharmacy directly. The new pharmacy will help you get your medications transferred.       Financial Assistance [phone]  [facility] [phone]  Central Billing Office, ealth: [phone]  [place] Billing [phone]  Medical Records [phone]  [place] Patient Bill of Rights [email or web address]       MENTAL HEALTH CRISIS RESOURCES:  For a emergency help, please call 911 or go to the nearest Emergency Department.     Emergency Walk-In Options:   Em[facility] Unit @ [place] [facility] ([place]): [phone] - Specialized mental health emergency area designed to be calming  [facility] ([place]): [phone]  [facility] ([place]): [phone]  [facility]  [place]): [phone]    [facility] Crisis Information:   [place]: [phone]  Erik: [phone]  Gregg (TAMMY) - Adult: [phone]     Child: [phone]  Mike - Adult: [phone]     Child: [phone]  [place]: [phone]  List of all [facility] resources:   https://[place].gov/dhs/people-we-serve/adults/health-care/mental-health/resources/crisis-contacts.jsp    National Crisis Information:   Crisis Text Line: Text  [place]  to 215788  National Suicide Prevention Lifeline: 8-575-172-TALK (1-903.287.8865)       For online chat options, visit https://suicidepreventionlifeline.org/chat/  Poison Control Center: 1-514.663.2334  Trans Lifeline: 1-610.603.4890 - Hotline for transgender people of all ages  The [employer] Project: 1-942.937.6024 - Hotline for LGBT youth     For Non-Emergency Support:   Fast Tracker: Mental Health & Substance Use Disorder Resources -   https://www.fasttrackermn.org/       Again thank you for choosing [facility] [place] [facility] and please let us know how we can best partner with you to improve you and your family's health.    You may be receiving a survey regarding this appointment. We would love to have your feedback, both positive and negative. The survey is done by an external company, so your answers are anonymous.

## 2022-08-19 NOTE — Clinical Note
Patient in [facility].  Please schedule week of Thanksgiving when he will be home in [place].  Thank you!   Meredith Dejesus, SANDIE, APRN, FMHNP-BC,

## 2022-08-19 NOTE — PROGRESS NOTES
PSYCHIATRIC MEDICATION FOLLOW UP APPT     Name:  Jarad Nolasco  : 2000    Jarad Nolasco is a 21 year old male who is being evaluated via a billable video visit.      How would you like to obtain your AVS? MyChart  If the video visit is dropped, the invitation should be resent by: Text to cell phone: [phone]  Will anyone else be joining your video visit? No       Location of patient:    If not at home address below, please ask where they are in case of an emergency situation arises during the appointment.  [address] [address]    Telemedicine Visit: The patient's condition can be safely assessed and treated via synchronous audio and visual telemedicine encounter.       Reason for Telemedicine Visit: COVID 19 pandemic and the social and physical recommendations by the CDC and MD.       Originating Site (Patient Location): Patient's home     Distant Site (Provider Location): Provider Remote Setting     Consent:  The patient/guardian has verbally consented to: the potential risks and benefits of telemedicine (video visit or phone) versus in person care; bill my insurance or make self-payment for services provided; and responsibility for payment of non-covered services.      Mode of Communication:  Sounday video platform      As the provider I attest to compliance with applicable laws and regulations related to telemedicine.     IDENTIFICATION   Referred by:  Davidson Perez PA-C   Patient Care Team:  Davidson Perez PA-C as PCP - General (Physician Assistant)  Davidson Perez PA-C as Assigned PCP  Therapist: therapist he sees off/on as needed.     Patient attended the phone/video session alone.    Last seen for outpatient psychiatry Return Visit on 07/15/2022.      FOLLOWING PLAN PUT INTO PLACE:  Tolerated the transition off the Abilify (which was stabilizing but with 70 lb weight gain) and onto Rexulti. Currently at low dose of 1 mg. Will increase to 2 mg. He [facility]  [facility]nd week of August to reevaluate. Then he will see me the week he leaves back to college in [place]. He will plan to continue to work with this provider during college breaks and he is in [place]. Overall doing well, but anxiety and rumination returning which is what the Abilify was targeting.     [facility] and the Rexulti was increased to 3 mg    INTERIM HISTORY     COMMUNICATIONS FROM PATIENT VIA:  none    RECORDS AVAILABLE FOR REVIEW: EHR records through [employer]  and previous psychiatric progress note.  In addition, reviewed the assessment completed by Roselyn Gutierrez [facility], dated today        HISTORY OF PRESENT ILLNESS   CCPS referral for psychiatric medication consult in June 2022.  Reports history of depression, anxiety, obsessive thoughts and ADHD.  Denies prior psychiatric hospitalizations. No history of suicidal thoughts or attempts. No history of self-injurious behaviors. Grew up in an intact home with all basic needs being met.     Reports past diagnosis of depression and anxiety.  Reports anxiety worsened in September 2004, and also began to include more diffuse anxiety (e.g., making decisions, fears of death). His parents were concerned about his level of activity and anxiety, and thus sought treatment (January 2005) at the [facility], where he was referred for cognitive-behavioral therapy and medication management of hyperactive symptoms. He has been tried on a variety of stimulant and nonstimulant medications to address hyperactive and inattentive symptoms.      The Abilify was initiated and has been stabilizing but he has gained a significant amount of weight (greater than 70 lbs).  Is working out and attempting to eat healthy.   He is home for the summer from college and returns for his senior in the fall of 2022.  He was off due to insurance issues for about three months and recently restarted it.  During that time, he noted  increase in OCD symptoms, anxiety, emotional lability, obsessive thinking and racing thoughts.      FAMILY, MEDICAL, SURGICAL HISTORY REVIEWED.  MEDICATION HAVE BEEN REVIEWED AND ARE CURRENT TO THE BEST OF MY KNOWLEDGE AND ABILITY.  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   Working full time   Home for the summer     MEDICATIONS                                                                                                Current Outpatient Medications   Medication Sig     albuterol (PROAIR HFA/PROVENTIL HFA/VENTOLIN HFA) 108 (90 Base) MCG/ACT inhaler INHALE 1 TO 2 PUFFS INTO THE LUNGS EVERY 4 HOURS AS NEEDED FOR SHORTNESS OF BREATH OR DIFFICULT BREATHING OR WHEEZING     brexpiprazole (REXULTI) 3 MG tablet Take 1 tablet (3 mg) by mouth daily     cloNIDine (CATAPRES) 0.2 MG tablet Take 0.2 mg by mouth nightly as needed     cyclobenzaprine (FLEXERIL) 10 MG tablet Take 0.5-1 tablets (5-10 mg) by mouth nightly as needed for muscle spasms     EPINEPHrine (ANY BX GENERIC EQUIV) 0.3 MG/0.3ML injection 2-pack INJECT INTRAMUSCULARLY ONCE AS DIRECTED FOR ANAPHYLAXIS     lisdexamfetamine (VYVANSE) 50 MG capsule Take 1 capsule (50 mg) by mouth every morning     traZODone (DESYREL) 50 MG tablet Take 1 tablet (50 mg) by mouth At Bedtime     venlafaxine (EFFEXOR XR) 150 MG 24 hr capsule Take 1 capsule (150 mg) by mouth daily     No current facility-administered medications for this visit.        NOTES ABOUT CURRENT PSYCHOTROPIC MEDICATIONS:   Rexulti 3 mg, increased when seeing [facility] CNP [facility] two weeks ago  Venlafaxine  mg approximately a year.  Trazodone 50 mg, many years  Vyvanse 50 mg.  He reports that he's taking vyvanse but it's not on the med sheet. states that it was originally prescribed by Estela and his provider at college has been refilling the rx for him.  Typically only takes during school     PAST PSYCHOTROPIC MEDICATIONS:  pharmacogenomic testing completed.    Many  "medication trials   Quetiapine   Olanzapine  Abilify 10 mg, 18 months approximately.  Was off for three months and started taking four days ago, 190 lbs and up to 260 lbs but this has helped the most for for OCD symptoms, anxiety, emotional lability, obsessive thinking and racing thoughts.    TODAY PATIENT REPORTS THE FOLLOWING PSYCHIATRIC ROS:   Per [facility], Roselyn Gutierrez, during today's team-based visit:  \"MH update: Pt reports that things have been going well but he couldn't get his rexulti filled because the pharmacy didn't have it. He's able to pick them up today. He returns to school in [place] next week. Mixed feelings about graduating in the Spring. He will be working on finding his career path this year. Increased stress related to returning to school may have increased his anxiety. He's albe to recognize this and use constructive self talk to manage. Continues to feel that his attention is well managed. Pt reports that he will be able to be seen at college for medication and counseling. He's already reached out to them about getting some appointments.  Stresses: returning to school, preparing to move  Appetite: unremarkable  Sleep: unremarkable  Therapy: no  ZENY: No  Preg: NA  Most important: update on medication. Other provider increased his rexulti.\"     EXERCISE: Adequate  SIDE EFFECTS:   tolerating medications without reported side effects  COMPLIANCE:   states Adherent to medication regimen  REPORTS THE FOLLOWING NEW MEDICAL ISSUES:   none    PROBLEM: DEPRESSION: Feels the current medication regimen is effective.  Has not increased the Rexulti to 3 mg as of yet  Last PHQ-9 7/27/2022   1.  Little interest or pleasure in doing things 0   2.  Feeling down, depressed, or hopeless 0   3.  Trouble falling or staying asleep, or sleeping too much 1   4.  Feeling tired or having little energy 1   5.  Poor appetite or overeating 0   6.  Feeling bad about yourself 0   7.  Trouble concentrating 1   8.  Moving " "slowly or restless 0   Q9: Thoughts of better off dead/self-harm past 2 weeks 0   PHQ-9 Total Score 3   Difficulty at work, home, or with people -   In the past two weeks have you had thoughts of suicide or self harm? -   Do you have concerns about your personal safety or the safety of others? -   Some encounter information is confidential and restricted. Go to Review Flowsheets activity to see all data.     PHQ-9 SCORE 6/14/2022 7/27/2022 8/10/2022   PHQ-9 Total Score MyChart 6 (Mild depression) 3 (Minimal depression) 6 (Mild depression)   PHQ-9 Total Score 6 3 -   Some encounter information is confidential and restricted. Go to Review Flowsheets activity to see all data.     PHQ9 score is 6 indicating mild depression. On 8/10/2022  Suicidal ideation:  No     PROBLEM: ANXIETY: No change.    GAD7 score is 5 on 8/10/2022  FLORINDA-7 SCORE 6/17/2022 6/17/2022 8/10/2022   Total Score - 15 (severe anxiety) 5 (mild anxiety)   Total Score 15 15 -   Some encounter information is confidential and restricted. Go to Review Flowsheets activity to see all data.     PROBLEM: SLEEP/INSOMNIA: Stable    PERTINENT PAST MEDICAL AND SURGICAL HISTORY   No past medical history on file.    VITALS     BP Readings from Last 1 Encounters:   07/27/22 (!) 143/84     Pulse Readings from Last 1 Encounters:   07/27/22 82     Wt Readings from Last 1 Encounters:   07/27/22 118 kg (260 lb 3.2 oz)     Ht Readings from Last 1 Encounters:   06/14/22 1.74 m (5' 8.5\")     Estimated body mass index is 38.99 kg/m  as calculated from the following:    Height as of 6/14/22: 1.74 m (5' 8.5\").    Weight as of 7/27/22: 118 kg (260 lb 3.2 oz).    LABS & IMAGING                                                                                                                   Recent Labs   Lab Test 05/17/18  1418   WBC 7.2   HGB 14.8   HCT 43.5   MCV 84      ANEU 3.8     Recent Labs   Lab Test 06/14/22  0754      POTASSIUM 4.3   CHLORIDE 108   CO2 29   GLC " 97   EDWIN 9.8   BUN 10   CR 0.74   GFRESTIMATED >90     Recent Labs   Lab Test 22  0754   CHOL 166   *   HDL 54   TRIG 47     Recent Labs   Lab Test 22  0754   TSH 0.49          ALLERGY & IMMUNIZATIONS       Allergies   Allergen Reactions     Peanuts [Nuts] Anaphylaxis, Nausea and Vomiting and Swelling       MEDICAL REVIEW OF SYSTEMS:   Ten system review was completed with pertinent positives noted     MENTAL STATUS EXAM:      General/Constitutional:  Appearance:   awake, alert, adequately groomed, appeared stated age and no apparent distress  Attitude:    cooperative   Eye Contact:  good  Musculoskeletal:  Psychomotor Behavior:  no evidence of tardive dyskinesia, dystonia, or tics from the head up  Psychiatric:  Speech:  clear, coherent, regular rate, rhythm, and volume,   No pressure speech noted.  Associations:  no loose associations  Thought Process:   logical, linear and goal oriented  Thought Content:    No evidence of suicidal ideation or homicidal ideation, no evidence of psychotic thought, no auditory hallucinations present and no visual hallucinations present  Mood: good, some stress with packing for school  Affect:  full range/stable (normal variation of emotions during exam) and was congruent to speech content.  Insight:  good  Judgment:  intact, adequate for safety  Impulse Control:  intact  Neurological:  Oriented to:  person, place, time, and situation  Attention Span and Concentration:  normal    Language: intact     Recent and Remote Memory:  Intact to interview. Not formally assessed. No amnesia.    Fund of Knowledge: appropriate        SAFETY   Feels safe in home: yes    Suicidal ideation: Denies  History of suicide attempts:  No   Hx of impulsivity: No   Hope for the future: present    Hx of Command hallucinations or current psychosis:  Denies   History of Self-injurious behaviors: denies   Family member  by suicide: Not discussed      SAFETY ASSESSMENT:   Based on all  available evidence including the factors cited above, overall Risk for harm is low and is appropriate for outpatient level of care.   Recommended that patient call 911 or go to the local ED should there be a change in any of these risk factors.        DSM 5 DIAGNOSIS:   300.02 (F41.1) Generalized Anxiety Disorder  300.3 (F42) Obsessive Compulsive Disorder     Attention-Deficit/Hyperactivity Disorder  314.01 (F90.2) Combined presentation      MEDICAL COMORBIDITY IMPACTING CLINICAL PICTURE: None noted.       ASSESSMENT AND PLAN       Problem List as of 8/19/2022 Reviewed: 8/19/2022  3:08 PM by Meredith Dejesus APRN CNP          Noted       Behavioral    Last System Assessment & Plan 8/19/2022 Virtual Visit Written 8/19/2022  3:44 PM by Meredith Dejesus APRN CNP     Tolerating the Rexulti.  Has not picked up the 3 mg but will start in the next few days.  Denies side effects.  Will continue the current plan and follow-up over Thanksgiving when he is home for break.    MEDICATIONS:   - continue the Rexulti at 3 mg, Venlafaxine  mg, trazodone and the Vyvanse 60 mg.  One script of the Vyvanse ordered when seen by Shila Ruelas and on file.  He will have further scripts for stimulant obtained from provider in [place].       FOLLOW UP: Schedule an appointment with me in over Thanksgiving break or sooner as needed.            1. FLORINDA (generalized anxiety disorder) 8/7/2018     Relevant Medications     venlafaxine (EFFEXOR XR) 150 MG 24 hr capsule           CONSULTS/REFERRALS:   Continue therapy   Coordinate care with therapist as needed     MEDICAL:   Metabolic labs due 6/2023.  Last completed 6/14/2022 of lipids and fasting glucose  Coordinate care with PCP (Davidson Perez) as needed  Follow up with primary care provider as planned or for acute medical concerns.     PSYCHOEDUCATION:  Medication side effects and alternatives reviewed. Health promotion activities recommended and reviewed today. All  questions addressed. Education and counseling completed regarding risks and benefits of medications and psychotherapy options.  Consent provided by patient/guardian  Call the psychiatric nurse line with medication questions or concerns at [phone].  CrossCurrenthart may be used to communicate with your provider, but this is not intended to be used for emergencies.  FIRST GENERATION ANTIPSYCHOTIC/ SECOND GENERATION ANTIPSYCHOTIC USE:  Atypical need for cardiometabolic monitoring with medication- B/P, weight, blood sugar, cholesterol.  Need to monitor for abnormal movements taught  [employer].gov is information for patients.  It is run by the [employer] and it contains information about all disorders, diseases and all medications.       COMMUNITY RESOURCES:    CRISIS NUMBERS: Provided in AVS 2022  National Suicide Prevention Lifeline: 4-581-926-TALK (515-193-0923)  [employer]/resources for a list of additional resources (SOS)            [facility] - [phone]   Urgent Care Adult Mental Nxpbne-[phone] mobile unit/  crisis line  [facility] -[phone]   COPE  [place] Mobile Team -[phone] (adults)/ [phone] (child)  Poison Control Center - 1-695.552.5232    OR  go to nearest ER  Crisis Text Line for any crisis  send this-   To: 6[address]   [facility] ([facility]  [phone]  National Suicide Prevention Lifeline: 968.754.1305 (TTY: 503.646.7079). Call anytime for help.  (www.suicidepreventionlifeline.org)  National [place] on Mental Illness (www.kaylie.org): 658.593.2028 or 789-289-5238.   Mental Health Association (www.mentalhealth.org): 292.699.6693 or 045-523-3790.  [place] Crisis Text Line: Text [place] to 245005  Suicide LifeLine Chat: suicideCode Re[employer].org/chat      ADMINISTRATIVE BILLIN minutes spent interviewing patient, reviewing referral documents, obtaining and reviewing outside records,  communication with other health specialists, and preparing this report on today's date    Video/Phone Start Time: 2:41 PM  Video/Phone End Time: 3:06    Patient Status:  Patient will continue to be seen for ongoing consultation and stabilization.    Signed:   Meredith Dejesus, MSN, APRN, [facility] ([facility])   Chart documentation done in part with Dragon Voice Recognition software.  Although reviewed after completion, some word and grammatical errors may remain.

## 2022-11-18 ENCOUNTER — OFFICE VISIT (OUTPATIENT)
Dept: PODIATRY | Facility: CLINIC | Age: 22
End: 2022-11-18
Payer: COMMERCIAL

## 2022-11-18 VITALS
BODY MASS INDEX: 38.51 KG/M2 | DIASTOLIC BLOOD PRESSURE: 92 MMHG | SYSTOLIC BLOOD PRESSURE: 145 MMHG | HEIGHT: 69 IN | WEIGHT: 260 LBS | HEART RATE: 87 BPM

## 2022-11-18 DIAGNOSIS — M76.822 POSTERIOR TIBIAL TENDON DYSFUNCTION (PTTD) OF BOTH LOWER EXTREMITIES: Primary | ICD-10-CM

## 2022-11-18 DIAGNOSIS — M72.2 PLANTAR FASCIITIS, LEFT: ICD-10-CM

## 2022-11-18 DIAGNOSIS — M76.821 POSTERIOR TIBIAL TENDON DYSFUNCTION (PTTD) OF BOTH LOWER EXTREMITIES: Primary | ICD-10-CM

## 2022-11-18 DIAGNOSIS — M72.2 PLANTAR FASCIITIS, RIGHT: ICD-10-CM

## 2022-11-18 PROCEDURE — 99203 OFFICE O/P NEW LOW 30 MIN: CPT | Performed by: PODIATRIST

## 2022-11-18 NOTE — LETTER
11/18/2022         RE: Jarad Nolasco  [address]  [place] [address]        Dear Colleague,    Thank you for referring your patient, Jarad Nolasco, to the [facility] [place]. Please see a copy of my visit note below.    PATIENT HISTORY:  Jarad Nolasco is a 22 year old male who presents to clinic as a self referral with a chief complaint of painful arches.  The patient is seen by themselves.  The patient relates the pain is primarily located around the plantar arches of both feet.  Reports insidious onset without acute precipitating event. that has been going on for several year(s).  The patient has previously tried custom orthotics which provided relief until recently when he has outgrown them.  The patient is requesting a new pair of orthotics to be made for continued offloading support.  Any previous notes and studies that pertain to the patient's condition were reviewed.    Pertinent medical, surgical and family history was reviewed in the [facility] chart.    Past Medical History: History reviewed. No pertinent past medical history.    Medications:   Current Outpatient Medications:      albuterol (PROAIR HFA/PROVENTIL HFA/VENTOLIN HFA) 108 (90 Base) MCG/ACT inhaler, INHALE 1 TO 2 PUFFS INTO THE LUNGS EVERY 4 HOURS AS NEEDED FOR SHORTNESS OF BREATH OR DIFFICULT BREATHING OR WHEEZING, Disp: 8.5 g, Rfl: 0     brexpiprazole (REXULTI) 3 MG tablet, Take 1 tablet (3 mg) by mouth daily, Disp: 90 tablet, Rfl: 0     cloNIDine (CATAPRES) 0.2 MG tablet, Take 0.2 mg by mouth nightly as needed, Disp: , Rfl:      cyclobenzaprine (FLEXERIL) 10 MG tablet, Take 0.5-1 tablets (5-10 mg) by mouth nightly as needed for muscle spasms, Disp: 20 tablet, Rfl: 0     EPINEPHrine (ANY BX GENERIC EQUIV) 0.3 MG/0.3ML injection 2-pack, INJECT INTRAMUSCULARLY ONCE AS DIRECTED FOR ANAPHYLAXIS, Disp: 2 each, Rfl: 0     lisdexamfetamine (VYVANSE) 50 MG capsule, Take 1 capsule (50 mg) by mouth every  "morning, Disp: , Rfl: 0     traZODone (DESYREL) 50 MG tablet, Take 1 tablet (50 mg) by mouth At Bedtime, Disp: 90 tablet, Rfl: 0     venlafaxine (EFFEXOR XR) 150 MG 24 hr capsule, Take 1 capsule (150 mg) by mouth daily, Disp: 90 capsule, Rfl: 0     Allergies:    Allergies   Allergen Reactions     Peanuts [Nuts] Anaphylaxis, Nausea and Vomiting and Swelling       Vitals: BP (!) 145/92   Pulse 87   Ht 1.74 m (5' 8.5\")   Wt 117.9 kg (260 lb)   BMI 38.96 kg/m    BMI= Body mass index is 38.96 kg/m .    LOWER EXTREMITY PHYSICAL EXAM    Dermatologic: Skin is intact to right and left lower extremity without significant lesions, rash or abrasion.        Vascular: DP & PT pulses are intact & regular on the right and left.   CFT and skin temperature is normal to the right and left lower extremity.     Neurologic: Lower extremity sensation is intact to light touch.  No evidence of weakness in the right and left lower extremity.        Musculoskeletal: Patient is ambulatory without assistive device or brace.  No gross ankle deformity noted.  No foot or ankle joint effusion is noted.  Noted pain on palpation over the posterior tibial tendon bilaterally.  Noted pain with inversion against resistance bilaterally.  Noted pain on palpation on the plantar instep near the insertion point of the plantar fascia bilaterally.  Noted tight gastroc complex bilaterally.         ASSESSMENT / PLAN:     ICD-10-CM    1. Posterior tibial tendon dysfunction (PTTD) of both lower extremities  M76.821 Orthotics and Prosthetics DME Orthotic; Foot Orthotics (functional rigid support); Bilateral    M76.822       2. Plantar fasciitis, right  M72.2 Orthotics and Prosthetics DME Orthotic; Foot Orthotics (functional rigid support); Bilateral      3. Plantar fasciitis, left  M72.2 Orthotics and Prosthetics DME Orthotic; Foot Orthotics (functional rigid support); Bilateral          I have explained to Jarad about the conditions.  We discussed the " underlying contributing factors to the condition as well as treatment options along with expected length of recovery.  At this time, the patient was educated on the importance of offloading supportive shoes and other devices.  I demonstrated to the patient calf stretches to perform every hour daily until symptoms resolve.  After symptoms resolve, the patient was advised to perform the stretches 3 times daily to prevent future recurrence.  The patient was instructed to perform warm soaks with Epson salt after which to also apply over-the-counter Voltaren gel to deeply massage the injured tissue.  The patient was instructed to do this on a daily basis until symptoms resolve.   The patient was prescribed custom orthotics that will aid in offloading the tension forces to the soft tissues and prevent further inflammation.   The patient may return in four weeks for reevaluation to determine if any further treatment will be needed.      Jarad verbalized agreement with and understanding of the rational for the diagnosis and treatment plan.  All questions were answered to best of my ability and the patient's satisfaction. The patient was advised to contact the clinic with any questions that may arise after the clinic visit.      Disclaimer: This note consists of symbols derived from keyboarding, dictation and/or voice recognition software. As a result, there may be errors in the script that have gone undetected. Please consider this when interpreting information found in this chart.       BILL Eng.P.CHANDRA., F.A.C.F.A.S.        Again, thank you for allowing me to participate in the care of your patient.        Sincerely,        Randy Casillas DPM

## 2022-11-18 NOTE — PATIENT INSTRUCTIONS

## 2022-11-18 NOTE — PROGRESS NOTES
PATIENT HISTORY:  Jarad Nolasco is a 22 year old male who presents to clinic as a self referral with a chief complaint of painful arches.  The patient is seen by themselves.  The patient relates the pain is primarily located around the plantar arches of both feet.  Reports insidious onset without acute precipitating event. that has been going on for several year(s).  The patient has previously tried custom orthotics which provided relief until recently when he has outgrown them.  The patient is requesting a new pair of orthotics to be made for continued offloading support.  Any previous notes and studies that pertain to the patient's condition were reviewed.    Pertinent medical, surgical and family history was reviewed in the Epic chart.    Past Medical History: History reviewed. No pertinent past medical history.    Medications:   Current Outpatient Medications:      albuterol (PROAIR HFA/PROVENTIL HFA/VENTOLIN HFA) 108 (90 Base) MCG/ACT inhaler, INHALE 1 TO 2 PUFFS INTO THE LUNGS EVERY 4 HOURS AS NEEDED FOR SHORTNESS OF BREATH OR DIFFICULT BREATHING OR WHEEZING, Disp: 8.5 g, Rfl: 0     brexpiprazole (REXULTI) 3 MG tablet, Take 1 tablet (3 mg) by mouth daily, Disp: 90 tablet, Rfl: 0     cloNIDine (CATAPRES) 0.2 MG tablet, Take 0.2 mg by mouth nightly as needed, Disp: , Rfl:      cyclobenzaprine (FLEXERIL) 10 MG tablet, Take 0.5-1 tablets (5-10 mg) by mouth nightly as needed for muscle spasms, Disp: 20 tablet, Rfl: 0     EPINEPHrine (ANY BX GENERIC EQUIV) 0.3 MG/0.3ML injection 2-pack, INJECT INTRAMUSCULARLY ONCE AS DIRECTED FOR ANAPHYLAXIS, Disp: 2 each, Rfl: 0     lisdexamfetamine (VYVANSE) 50 MG capsule, Take 1 capsule (50 mg) by mouth every morning, Disp: , Rfl: 0     traZODone (DESYREL) 50 MG tablet, Take 1 tablet (50 mg) by mouth At Bedtime, Disp: 90 tablet, Rfl: 0     venlafaxine (EFFEXOR XR) 150 MG 24 hr capsule, Take 1 capsule (150 mg) by mouth daily, Disp: 90 capsule, Rfl: 0     Allergies:   "  Allergies   Allergen Reactions     Peanuts [Nuts] Anaphylaxis, Nausea and Vomiting and Swelling       Vitals: BP (!) 145/92   Pulse 87   Ht 1.74 m (5' 8.5\")   Wt 117.9 kg (260 lb)   BMI 38.96 kg/m    BMI= Body mass index is 38.96 kg/m .    LOWER EXTREMITY PHYSICAL EXAM    Dermatologic: Skin is intact to right and left lower extremity without significant lesions, rash or abrasion.        Vascular: DP & PT pulses are intact & regular on the right and left.   CFT and skin temperature is normal to the right and left lower extremity.     Neurologic: Lower extremity sensation is intact to light touch.  No evidence of weakness in the right and left lower extremity.        Musculoskeletal: Patient is ambulatory without assistive device or brace.  No gross ankle deformity noted.  No foot or ankle joint effusion is noted.  Noted pain on palpation over the posterior tibial tendon bilaterally.  Noted pain with inversion against resistance bilaterally.  Noted pain on palpation on the plantar instep near the insertion point of the plantar fascia bilaterally.  Noted tight gastroc complex bilaterally.         ASSESSMENT / PLAN:     ICD-10-CM    1. Posterior tibial tendon dysfunction (PTTD) of both lower extremities  M76.821 [facility] DME Orthotic; Foot Orthotics (functional rigid support); Bilateral    M76.822       2. Plantar fasciitis, right  M72.2 [facility]otics [facility] Prosthetics DME Orthotic; Foot Orthotics (functional rigid support); Bilateral      3. Plantar fasciitis, left  M72.2 [facility]otics [facility] Prosthetics DME Orthotic; Foot Orthotics (functional rigid support); Bilateral          I have explained to Jarad about the conditions.  We discussed the underlying contributing factors to the condition as well as treatment options along with expected length of recovery.  At this time, the patient was educated on the importance of offloading supportive shoes and other devices.  I demonstrated to the patient calf " stretches to perform every hour daily until symptoms resolve.  After symptoms resolve, the patient was advised to perform the stretches 3 times daily to prevent future recurrence.  The patient was instructed to perform warm soaks with Epson salt after which to also apply over-the-counter Voltaren gel to deeply massage the injured tissue.  The patient was instructed to do this on a daily basis until symptoms resolve.   The patient was prescribed custom orthotics that will aid in offloading the tension forces to the soft tissues and prevent further inflammation.   The patient may return in four weeks for reevaluation to determine if any further treatment will be needed.      Jarad verbalized agreement with and understanding of the rational for the diagnosis and treatment plan.  All questions were answered to best of my ability and the patient's satisfaction. The patient was advised to contact the clinic with any questions that may arise after the clinic visit.      Disclaimer: This note consists of symbols derived from keyboarding, dictation and/or voice recognition software. As a result, there may be errors in the script that have gone undetected. Please consider this when interpreting information found in this chart.       KEMI Casillas D.P.M., F.A.C.F.A.S.

## 2022-11-18 NOTE — NURSING NOTE
"Chief Complaint   Patient presents with     Consult     New orthotics- gets pains into legs       Initial BP (!) 145/92   Pulse 87   Ht 1.74 m (5' 8.5\")   Wt 117.9 kg (260 lb)   BMI 38.96 kg/m   Estimated body mass index is 38.96 kg/m  as calculated from the following:    Height as of this encounter: 1.74 m (5' 8.5\").    Weight as of this encounter: 117.9 kg (260 lb).  Medications and allergies reviewed.      Roselyn KHAN MA    "

## 2023-05-15 ENCOUNTER — PATIENT OUTREACH (OUTPATIENT)
Dept: CARE COORDINATION | Facility: CLINIC | Age: 23
End: 2023-05-15
Payer: COMMERCIAL

## 2023-05-30 ENCOUNTER — PATIENT OUTREACH (OUTPATIENT)
Dept: CARE COORDINATION | Facility: CLINIC | Age: 23
End: 2023-05-30
Payer: COMMERCIAL

## 2023-08-10 NOTE — TELEPHONE ENCOUNTER
Subjective:     Chief Complaint   Patient presents with    Medication Management       HPI:   Dayana presents today to discuss the following.    Slow transit constipation  Chronic issue  Did a trial of miralax  No longer taking   Taking prune juice daily   Last BM was 1 day ago      Past Medical History:   Diagnosis Date    Allergy     Anxiety     ASTHMA     Bronchitis     CAD (coronary artery disease)     JT to RCA; 70% stenosis in LAD    Chills     Constipation     Difficulty breathing     GERD (gastroesophageal reflux disease)     Heart attack ([facility])     Heartburn     Hyperlipidemia     Hypertension     Influenza     Insomnia     Lumbar back pain 9/10/2016    Mild peripheral edema 7/31/2020    Mumps     OSTEOPOROSIS     Palpitations     Peripheral vascular disease, unspecified ([facility]) 9/14/2021    Pneumonia     Post concussion syndrome 9/11/2020    PVD (peripheral vascular disease) ([facility])     70% PAD-followed by Lary AMBROCIO    Sweat, sweating, excessive     Tonsillitis        Current Outpatient Medications Ordered in Epic   Medication Sig Dispense Refill    albuterol 108 (90 Base) MCG/ACT Aero Soln inhalation aerosol       carvedilol (COREG) 3.125 MG Tab       gabapentin (NEURONTIN) 400 MG Cap       bisacodyl (DULCOLAX) 5 MG EC tablet Take 1 Tablet by mouth every day. 90 Tablet 1    fluticasone (FLONASE) 50 MCG/ACT nasal spray USE 1 SPRAY IN EACH NOSTRIL ONCE A DAY 16 g 0    LORazepam (ATIVAN) 0.5 MG Tab Take 1 Tablet by mouth every evening for 30 days. 30 Tablet 0    lisinopril (PRINIVIL) 10 MG Tab Take 1 Tablet by mouth every day. 100 Tablet 3    isosorbide mononitrate SR (IMDUR) 60 MG TABLET SR 24 HR Take 1 Tablet by mouth every evening. 90 Tablet 3    aspirin 81 MG EC tablet Chew 81 mg every day.      CRANBERRY PO Take 1 Tablet by mouth every day.      Multiple Vitamins-Minerals (OCUVITE-LUTEIN) Tab Take 1 Tablet by mouth every day.      Saline (OCEAN NASAL SPRAY NA) Administer 1 Spray into affected nostril(S)  justine is due for a visit with me for a med check. Schedule him for a virtual (video or phone based on patient preference. Always promote a video visit first) visit with me.       "every day.      montelukast (SINGULAIR) 10 MG Tab TAKE ONE TABLET BY MOUTH IN THE EVENING (Patient taking differently: Take 10 mg by mouth every evening.) 90 Tablet 3    atorvastatin (LIPITOR) 80 MG tablet Take 1 Tablet by mouth every evening. 100 Tablet 3    loratadine (CLARITIN) 10 MG Tab Take 1 Tablet by mouth every day.      Acetaminophen 500 MG Cap Take 1 Capsule by mouth 2 times a day. Morning & Afternoon, sometimes takes 3 times a day      Melatonin 10 MG Tab Take 10 mg by mouth at bedtime.      Biotin w/ Vitamins C & E 1250-7.5-7.5 MCG-MG-UNT Chew Tab Chew 1 Tab every morning.      guaifenesin LA (MUCINEX) 600 MG TABLET SR 12 HR Take 600 mg by mouth every morning.      Ascorbic Acid (VITAMIN C) 1000 MG Tab Take 1 Tablet by mouth every morning.      Cholecalciferol (VITAMIN D) 50 MCG (2000 UT) Cap Take 2,000 Units by mouth every morning.      omeprazole (PRILOSEC) 20 MG delayed-release capsule Take 1 Capsule by mouth every morning with breakfast.      VITAMIN E PO Take 1 Cap by mouth every morning. (Patient not taking: Reported on 8/10/2023)       No current [facility]-ordered facility-administered medications on file.       Allergies:  Bactrim [sulfamethoxazole w-trimethoprim], Pcn [penicillins], Codeine, and Tramadol    Health Maintenance: Completed    ROS:  Gen: no fevers/chills, no changes in weight  Eyes: no changes in vision  Pulm: no sob, no cough  CV: no chest pain, no palpitations  GI: no nausea/vomiting, no diarrhea      Objective:     Exam:  BP (!) 140/72 (BP Location: Left arm, Patient Position: Sitting, BP Cuff Size: Adult)   Pulse 68   Temp 37.1 °C (98.7 °F) (Temporal)   Ht 1.575 m (5' 2\")   Wt 54.7 kg (120 lb 9.6 oz)   LMP  (LMP Unknown)   SpO2 92%   BMI 22.06 kg/m²  Body mass index is 22.06 kg/m².      Constitutional: Alert, no distress, well-groomed.  Skin: Warm, dry, good turgor, no rashes in visible areas.  Eye: Equal, round and reactive, conjunctiva clear, lids normal.  ENMT: Lips without " lesions, good dentition, moist mucous membranes.  Neck: Trachea midline, no masses, no thyromegaly.  Respiratory: Unlabored respiratory effort, no cough.  MSK: Normal gait, moves all extremities.  Neuro: Grossly non-focal.   Psych: Alert and oriented x3, normal affect and mood.        Assessment & Plan:     89 y.o. female with the following -     1. Slow transit constipation  Chronic, uncontrolled condition.  Patient continues to experience breakthrough constipation.  Did not tolerate MiraLAX as she started having diarrhea with associated incontinence.  As such we will transition to Dulcolax at a low dose.  Continue to stay hydrated.  Return precautions recommended.  - bisacodyl (DULCOLAX) 5 MG EC tablet; Take 1 Tablet by mouth every day.  Dispense: 90 Tablet; Refill: 1      No follow-ups on file.          Please note that this dictation was created using voice recognition software. I have made every reasonable attempt to correct obvious errors, but I expect that there are errors of grammar and possibly content that I did not discover before finalizing the note.

## 2023-08-23 ASSESSMENT — ASTHMA QUESTIONNAIRES: ACT_TOTALSCORE: 23

## 2023-08-30 ENCOUNTER — VIRTUAL VISIT (OUTPATIENT)
Dept: FAMILY MEDICINE | Facility: CLINIC | Age: 23
End: 2023-08-30
Payer: COMMERCIAL

## 2023-08-30 DIAGNOSIS — F90.0 ATTENTION DEFICIT HYPERACTIVITY DISORDER (ADHD), PREDOMINANTLY INATTENTIVE TYPE: Primary | ICD-10-CM

## 2023-08-30 DIAGNOSIS — F33.1 MODERATE EPISODE OF RECURRENT MAJOR DEPRESSIVE DISORDER (H): ICD-10-CM

## 2023-08-30 DIAGNOSIS — F41.1 GAD (GENERALIZED ANXIETY DISORDER): ICD-10-CM

## 2023-08-30 DIAGNOSIS — E66.01 MORBID OBESITY (H): ICD-10-CM

## 2023-08-30 PROCEDURE — 99214 OFFICE O/P EST MOD 30 MIN: CPT | Mod: VID | Performed by: PHYSICIAN ASSISTANT

## 2023-08-30 RX ORDER — LISDEXAMFETAMINE DIMESYLATE 50 MG/1
50 CAPSULE ORAL EVERY MORNING
Qty: 30 CAPSULE | Refills: 0 | Status: SHIPPED | OUTPATIENT
Start: 2023-08-30 | End: 2023-10-31

## 2023-08-30 ASSESSMENT — PATIENT HEALTH QUESTIONNAIRE - PHQ9
SUM OF ALL RESPONSES TO PHQ QUESTIONS 1-9: 9
10. IF YOU CHECKED OFF ANY PROBLEMS, HOW DIFFICULT HAVE THESE PROBLEMS MADE IT FOR YOU TO DO YOUR WORK, TAKE CARE OF THINGS AT HOME, OR GET ALONG WITH OTHER PEOPLE: SOMEWHAT DIFFICULT
SUM OF ALL RESPONSES TO PHQ QUESTIONS 1-9: 9

## 2023-08-30 NOTE — PROGRESS NOTES
Jarad is a 22 year old who is being evaluated via a billable video visit.      How would you like to obtain your AVS? MyChart  If the video visit is dropped, the invitation should be resent by: Text to cell phone: [phone]  Will anyone else be joining your video visit? No            Subjective   Jarad is a 22 year old, presenting for the following health issues:  Recheck Medication        8/30/2023     8:34 AM   Additional Questions   Roomed by Brandy   Accompanied by Self       History of Present Illness       Reason for visit:  Medications    He eats 0-1 servings of fruits and vegetables daily.He consumes 2 sweetened beverage(s) daily.He exercises with enough effort to increase his heart rate 20 to 29 minutes per day.  He exercises with enough effort to increase his heart rate 3 or less days per week.   He is taking medications regularly.     Mood overall stable.   Taking rexulti. No depression/anhedonia. No insomnia. Some occasional naps. No panic attacks.  No side effects to meds.  Recheck of his adhd. Vyvanse working well on target symptoms of inattention and task completion.     Obesity issues. Walks a lot. Currently is 5 ft 8.5 inches and weighs 280 plus lbs.       Review of Systems   Constitutional, HEENT, cardiovascular, pulmonary, GI, , musculoskeletal, neuro, skin, endocrine and psych systems are negative, except as otherwise noted.      Objective           Vitals:  No vitals were obtained today due to virtual visit.    Physical Exam   GENERAL: Healthy, alert and no distress  EYES: Eyes grossly normal to inspection.  No discharge or erythema, or obvious scleral/conjunctival abnormalities.  RESP: No audible wheeze, cough, or visible cyanosis.  No visible retractions or increased work of breathing.    SKIN: Visible skin clear. No significant rash, abnormal pigmentation or lesions.  NEURO: Cranial nerves grossly intact.  Mentation and speech appropriate for age.  PSYCH: Mentation appears normal,  affect normal/bright, judgement and insight intact, normal speech and appearance well-groomed.  Jarad was seen today for recheck medication.    Diagnoses and all orders for this visit:    Attention deficit hyperactivity disorder (ADHD), predominantly inattentive type  -     lisdexamfetamine (VYVANSE) 50 MG capsule; Take 1 capsule (50 mg) by mouth every morning    FLORINDA (generalized anxiety disorder)    Moderate episode of recurrent major depressive disorder (H)    Morbid obesity (H)  -     Semaglutide-Weight Management (WEGOVY) 0.25 MG/0.5ML pen; Inject 0.25 mg Subcutaneous once a week    Other orders  -     REVIEW OF HEALTH MAINTENANCE PROTOCOL ORDERS      Work on a lower calorie diet and get consistent exercise.       Video-Visit Details    Type of service:  Video Visit     Originating Location (pt. Location): Home    Distant Location (provider location):  On-site  Platform used for Video Visit: Patricio

## 2023-09-05 DIAGNOSIS — E66.01 MORBID OBESITY (H): Primary | ICD-10-CM

## 2023-09-05 NOTE — TELEPHONE ENCOUNTER
Pt Mom (consent to communicate on file) calling to state that pt's pharmacy only has ozempic available and not wegovy. No history of pt on ozempic.     Hany'd ozempic (non-specific dose) for pcp to advise on dose.     Lilibeth Carlton RN on 9/5/2023 at 12:27 PM

## 2023-09-17 ENCOUNTER — HEALTH MAINTENANCE LETTER (OUTPATIENT)
Age: 23
End: 2023-09-17

## 2023-10-04 ENCOUNTER — VIRTUAL VISIT (OUTPATIENT)
Dept: FAMILY MEDICINE | Facility: CLINIC | Age: 23
End: 2023-10-04
Payer: COMMERCIAL

## 2023-10-04 DIAGNOSIS — E66.01 MORBID OBESITY (H): ICD-10-CM

## 2023-10-04 PROCEDURE — 99213 OFFICE O/P EST LOW 20 MIN: CPT | Mod: VID | Performed by: PHYSICIAN ASSISTANT

## 2023-10-04 NOTE — PROGRESS NOTES
Jarad is a 22 year old who is being evaluated via a billable video visit.      How would you like to obtain your AVS? [employer] [facility] [employer]ids  If the video visit is dropped, the invitation should be resent by: Text to cell phone: [phone]  Will anyone else be joining your video visit? No            Subjective   Jarad is a 22 year old, presenting for the following health issues:  Recheck Medication      10/4/2023     9:22 AM   Additional Questions   Roomed by completed through [employer]   Accompanied by None       History of Present Illness       Reason for visit:  Weight loss medicstion updates    He eats 0-1 servings of fruits and vegetables daily.He consumes 2 sweetened beverage(s) daily.He exercises with enough effort to increase his heart rate 20 to 29 minutes per day.  He exercises with enough effort to increase his heart rate 3 or less days per week.   He is taking medications regularly.     Taking ozempic. Has helped him eat less. No profound side effects. Has lost 9 lbs. Walking more (3-4 days per week).        Review of Systems   Constitutional, HEENT, cardiovascular, pulmonary, GI, , musculoskeletal, neuro, skin, endocrine and psych systems are negative, except as otherwise noted.      Objective           Vitals:  No vitals were obtained today due to virtual visit.    Physical Exam   GENERAL: Healthy, alert and no distress  EYES: Eyes grossly normal to inspection.  No discharge or erythema, or obvious scleral/conjunctival abnormalities.  RESP: No audible wheeze, cough, or visible cyanosis.  No visible retractions or increased work of breathing.    SKIN: Visible skin clear. No significant rash, abnormal pigmentation or lesions.  NEURO: Cranial nerves grossly intact.  Mentation and speech appropriate for age.  PSYCH: Mentation appears normal, affect normal/bright, judgement and insight intact, normal speech and appearance well-groomed.  Jarad was seen today for recheck medication.    Diagnoses and all orders  for this visit:    Morbid obesity (H)  -     semaglutide (OZEMPIC) 2 MG/3ML pen; Inject 0.5 mg Subcutaneous every 7 days      Continue with a lower calorie diet and consistent exercise.   Will continue to titrate dose of ozempic   Video-Visit Details    Type of service:  Video Visit     Originating Location (pt. Location): Home    Distant Location (provider location):  On-site  Platform used for Video Visit: [employer]

## 2023-10-04 NOTE — LETTER
My Asthma Action Plan    Name: Jarad Nolasco   YOB: 2000  Date: 10/4/2023   My doctor: Davidson Perez PA-C   My clinic: [facility]        My Control Medicine:   My Rescue Medicine: Albuterol (Proair/Ventolin/Proventil HFA) 2-4 puffs EVERY 4 HOURS as needed. Use a spacer if recommended by your provider.   My Asthma Severity:   Mild Persistent  Know your asthma triggers:   upper respiratory infections            GREEN ZONE   Good Control  I feel good  No cough or wheeze  Can work, sleep and play without asthma symptoms       Take your asthma control medicine every day.     If exercise triggers your asthma, take your rescue medication  15 minutes before exercise or sports, and  During exercise if you have asthma symptoms  Spacer to use with inhaler: If you have a spacer, make sure to use it with your inhaler             YELLOW ZONE Getting Worse  I have ANY of these:  I do not feel good  Cough or wheeze  Chest feels tight  Wake up at night   Keep taking your Green Zone medications  Start taking your rescue medicine:  every 20 minutes for up to 1 hour. Then every 4 hours for 24-48 hours.  If you stay in the Yellow Zone for more than 12-24 hours, contact your doctor.  If you do not return to the Green Zone in 12-24 hours or you get worse, start taking your oral steroid medicine if prescribed by your provider.           RED ZONE Medical Alert - Get Help  I have ANY of these:  I feel awful  Medicine is not helping  Breathing getting harder  Trouble walking or talking  Nose opens wide to breathe       Take your rescue medicine NOW  If your provider has prescribed an oral steroid medicine, start taking it NOW  Call your doctor NOW  If you are still in the Red Zone after 20 minutes and you have not reached your doctor:  Take your rescue medicine again and  Call 911 or go to the emergency room right away    See your regular doctor within 2 weeks of an Emergency Room or Urgent Care  visit for follow-up treatment.          Annual Reminders:  Meet with Asthma Educator,  Flu Shot in the Fall, consider Pneumonia Vaccination for patients with asthma (aged 19 and older).    Pharmacy:    [id number] - [place], [place] - [address] AT [facility] [address]  [id number] - [place], [place] - [address]    Electronically signed by Davidson Perez PA-C   Date: 10/04/23                      Asthma Triggers  How To Control Things That Make Your Asthma Worse    Triggers are things that make your asthma worse.  Look at the list below to help you find your triggers and what you can do about them.  You can help prevent asthma flare-ups by staying away from your triggers.      Trigger                                                          What you can do   Cigarette Smoke  Tobacco smoke can make asthma worse. Do not allow smoking in your home, car or around you.  Be sure no one smokes at a child s day care or school.  If you smoke, ask your health care provider for ways to help you quit.  Ask family members to quit too.  Ask your health care provider for a referral to Quit Plan to help you quit smoking, or call 8-378-532-PLAN.     Colds, Flu, Bronchitis  These are common triggers of asthma. Wash your hands often.  Don t touch your eyes, nose or mouth.  Get a flu shot every year.     Dust Mites  These are tiny bugs that live in cloth or carpet. They are too small to see. Wash sheets and blankets in hot water every week.   Encase pillows and mattress in dust mite proof covers.  Avoid having carpet if you can. If you have carpet, vacuum weekly.   Use a dust mask and HEPA vacuum.   Pollen and Outdoor Mold  Some people are allergic to trees, grass, or weed pollen, or molds. Try to keep your windows closed.  Limit time out doors when pollen count is high.   Ask you health care provider about taking medicine during allergy season.     Animal Dander  Some people are allergic to  skin flakes, urine or saliva from pets with fur or feathers. Keep pets with fur or feathers out of your home.    If you can t keep the pet outdoors, then keep the pet out of your bedroom.  Keep the bedroom door closed.  Keep pets off cloth furniture and away from stuffed toys.     Mice, Rats, and Cockroaches   Some people are allergic to the waste from these pests.   Cover food and garbage.  Clean up spills and food crumbs.  Store grease in the refrigerator.   Keep food out of the bedroom.   Indoor Mold  This can be a trigger if your home has high moisture. Fix leaking faucets, pipes, or other sources of water.   Clean moldy surfaces.  Dehumidify basement if it is damp and smelly.   Smoke, Strong Odors, and Sprays  These can reduce air quality. Stay away from strong odors and sprays, such as perfume, powder, hair spray, paints, smoke incense, paint, cleaning products, candles and new carpet.   Exercise or Sports  Some people with asthma have this trigger. Be active!  Ask your doctor about taking medicine before sports or exercise to prevent symptoms.    Warm up for 5-10 minutes before and after sports or exercise.     Other Triggers of Asthma  Cold air:  Cover your nose and mouth with a scarf.  Sometimes laughing or crying can be a trigger.  Some medicines and food can trigger asthma.

## 2023-10-31 ENCOUNTER — VIRTUAL VISIT (OUTPATIENT)
Dept: FAMILY MEDICINE | Facility: CLINIC | Age: 23
End: 2023-10-31
Payer: COMMERCIAL

## 2023-10-31 DIAGNOSIS — I10 BENIGN ESSENTIAL HYPERTENSION: ICD-10-CM

## 2023-10-31 DIAGNOSIS — F33.1 MODERATE EPISODE OF RECURRENT MAJOR DEPRESSIVE DISORDER (H): ICD-10-CM

## 2023-10-31 DIAGNOSIS — F90.0 ATTENTION DEFICIT HYPERACTIVITY DISORDER (ADHD), PREDOMINANTLY INATTENTIVE TYPE: ICD-10-CM

## 2023-10-31 DIAGNOSIS — E66.01 MORBID OBESITY (H): Primary | ICD-10-CM

## 2023-10-31 PROCEDURE — 99213 OFFICE O/P EST LOW 20 MIN: CPT | Mod: VID | Performed by: PHYSICIAN ASSISTANT

## 2023-10-31 RX ORDER — AMLODIPINE AND BENAZEPRIL HYDROCHLORIDE 10; 20 MG/1; MG/1
1 CAPSULE ORAL DAILY
Qty: 90 CAPSULE | Refills: 1 | Status: SHIPPED | OUTPATIENT
Start: 2023-10-31 | End: 2024-03-18

## 2023-10-31 RX ORDER — LISDEXAMFETAMINE DIMESYLATE 50 MG/1
50 CAPSULE ORAL EVERY MORNING
Qty: 30 CAPSULE | Refills: 0 | Status: SHIPPED | OUTPATIENT
Start: 2023-10-31 | End: 2023-12-01

## 2023-10-31 NOTE — PROGRESS NOTES
Jarad is a 23 year old who is being evaluated via a billable video visit.      How would you like to obtain your AVS? [employer]  If the video visit is dropped, the invitation should be resent by: Text to cell phone: [phone]  Will anyone else be joining your video visit? No            Subjective   Jarad is a 23 year old, presenting for the following health issues:  Recheck Medication      10/31/2023     8:37 AM   Additional Questions   Roomed by completed through [employer]   Accompanied by None         10/31/2023     8:37 AM   Patient Reported Additional Medications   Patient reports taking the following new medications none       History of Present Illness       Reason for visit:  Medication dosage    He eats 0-1 servings of fruits and vegetables daily.He consumes 3 sweetened beverage(s) daily.He exercises with enough effort to increase his heart rate 10 to 19 minutes per day.  He exercises with enough effort to increase his heart rate 3 or less days per week.   He is taking medications regularly.     Taking and tolerating ozempic. Due for a dosage change . Has lost some weight . Plans to start working out more .  Adhd stable. Vyvanse works well on target symptoms .   His mood is better. Denies depression. No anhedonia. Sleeping well.   Back working again.   Taking lotrel for his blood pressure . Blood pressure has run with in normal range .   No chest pain/sob/palpitations/dizziness/ha's    Review of Systems   Constitutional, HEENT, cardiovascular, pulmonary, GI, , musculoskeletal, neuro, skin, endocrine and psych systems are negative, except as otherwise noted.      Objective           Vitals:  No vitals were obtained today due to virtual visit.    Physical Exam   GENERAL: Healthy, alert and no distress  EYES: Eyes grossly normal to inspection.  No discharge or erythema, or obvious scleral/conjunctival abnormalities.  RESP: No audible wheeze, cough, or visible cyanosis.  No visible retractions or  increased work of breathing.    SKIN: Visible skin clear. No significant rash, abnormal pigmentation or lesions.  NEURO: Cranial nerves grossly intact.  Mentation and speech appropriate for age.  PSYCH: Mentation appears normal, affect normal/bright, judgement and insight intact, normal speech and appearance well-groomed.  Jarad was seen today for recheck medication.    Diagnoses and all orders for this visit:    Morbid obesity (H)  -     Semaglutide, 1 MG/DOSE, (OZEMPIC) 4 MG/3ML pen; Inject 1 mg Subcutaneous every 7 days    Moderate episode of recurrent major depressive disorder (H)    Attention deficit hyperactivity disorder (ADHD), predominantly inattentive type  -     lisdexamfetamine (VYVANSE) 50 MG capsule; Take 1 capsule (50 mg) by mouth every morning    Benign essential hypertension  -     amLODIPine-benazepril (LOTREL) 10-20 MG capsule; Take 1 capsule by mouth daily      Lower sodium , lower calorie diet.  More exercise.  Recheck in 6 mos   Will continue to titrate ozempic to 2 mg per week.      Video-Visit Details    Type of service:  Video Visit     Originating Location (pt. Location): Home    Distant Location (provider location):  On-site  Platform used for Video Visit: Patricio

## 2023-12-01 DIAGNOSIS — F90.0 ATTENTION DEFICIT HYPERACTIVITY DISORDER (ADHD), PREDOMINANTLY INATTENTIVE TYPE: ICD-10-CM

## 2023-12-01 RX ORDER — LISDEXAMFETAMINE DIMESYLATE 50 MG/1
50 CAPSULE ORAL EVERY MORNING
Qty: 30 CAPSULE | Refills: 0 | Status: SHIPPED | OUTPATIENT
Start: 2023-12-01 | End: 2024-03-21

## 2023-12-01 NOTE — TELEPHONE ENCOUNTER
[facility] called stating [facility] not been able [facility] his [facility]se script since it was sent in due to not being in stock. Patient found it in stock at [facility] and would like to have it transferred there. He stated he took his last dose today and will be out tomorrow.       Tammy Hines RN on 12/1/2023 at 3:44 PM

## 2023-12-11 ENCOUNTER — VIRTUAL VISIT (OUTPATIENT)
Dept: FAMILY MEDICINE | Facility: CLINIC | Age: 23
End: 2023-12-11
Payer: COMMERCIAL

## 2023-12-11 DIAGNOSIS — E66.01 MORBID OBESITY (H): ICD-10-CM

## 2023-12-11 PROCEDURE — 99213 OFFICE O/P EST LOW 20 MIN: CPT | Mod: VID | Performed by: PHYSICIAN ASSISTANT

## 2023-12-11 ASSESSMENT — PATIENT HEALTH QUESTIONNAIRE - PHQ9
SUM OF ALL RESPONSES TO PHQ QUESTIONS 1-9: 3
SUM OF ALL RESPONSES TO PHQ QUESTIONS 1-9: 3
10. IF YOU CHECKED OFF ANY PROBLEMS, HOW DIFFICULT HAVE THESE PROBLEMS MADE IT FOR YOU TO DO YOUR WORK, TAKE CARE OF THINGS AT HOME, OR GET ALONG WITH OTHER PEOPLE: SOMEWHAT DIFFICULT

## 2023-12-11 NOTE — PROGRESS NOTES
Jarad is a 23 year old who is being evaluated via a billable video visit.      How would you like to obtain your AVS? MyChart  If the video visit is dropped, the invitation should be resent by: Text to cell phone: [phone]  Will anyone else be joining your video visit? No            Subjective   Jarad is a 23 year old, presenting for the following health issues:  Recheck Medication      12/11/2023     4:50 PM   Additional Questions   Roomed by Aurea Paul CMA   Accompanied by N/A         12/11/2023     4:50 PM   Patient Reported Additional Medications   Patient reports taking the following new medications No new medications     Patient would like a refill/increase on ozempic medication    History of Present Illness       Reason for visit:  Medication increase on ozempic    He eats 0-1 servings of fruits and vegetables daily.He consumes 2 sweetened beverage(s) daily.He exercises with enough effort to increase his heart rate 10 to 19 minutes per day.  He exercises with enough effort to increase his heart rate 3 or less days per week. He is missing 1 dose(s) of medications per week.  He is not taking prescribed medications regularly due to remembering to take.     Taking and tolerating the 1 mg per week.   Denies side effects.   Has lost 5 more lbs. Has lost 16 lbs in total so far.  Modifying his diet.   Needs to work out more consistently.          Review of Systems   Constitutional, HEENT, cardiovascular, pulmonary, GI, , musculoskeletal, neuro, skin, endocrine and psych systems are negative, except as otherwise noted.      Objective           Vitals:  No vitals were obtained today due to virtual visit.    Physical Exam   GENERAL: Healthy, alert and no distress  EYES: Eyes grossly normal to inspection.  No discharge or erythema, or obvious scleral/conjunctival abnormalities.  RESP: No audible wheeze, cough, or visible cyanosis.  No visible retractions or increased work of breathing.    SKIN: Visible  Mother signed infant's discharge instructions and was given a copy for her records. Final ID band check completed with mother and infant. Correct ID confirmed. Hugs tag disabled and removed. skin clear. No significant rash, abnormal pigmentation or lesions.  NEURO: Cranial nerves grossly intact.  Mentation and speech appropriate for age.  PSYCH: Mentation appears normal, affect normal/bright, judgement and insight intact, normal speech and appearance well-groomed.  Jarad was seen today for recheck medication.    Diagnoses and all orders for this visit:    Morbid obesity (H)  -     Semaglutide, 2 MG/DOSE, (OZEMPIC) 8 MG/3ML pen; Inject 2 mg Subcutaneous every 7 days      work on lifestyle modification    Video-Visit Details    Type of service:  Video Visit     Originating Location (pt. Location): Home    Distant Location (provider location):  On-site  Platform used for Video Visit: Patricio

## 2024-01-31 ENCOUNTER — TRANSFERRED RECORDS (OUTPATIENT)
Dept: HEALTH INFORMATION MANAGEMENT | Facility: CLINIC | Age: 24
End: 2024-01-31
Payer: COMMERCIAL

## 2024-02-01 ENCOUNTER — TRANSFERRED RECORDS (OUTPATIENT)
Dept: HEALTH INFORMATION MANAGEMENT | Facility: CLINIC | Age: 24
End: 2024-02-01
Payer: COMMERCIAL

## 2024-03-15 ASSESSMENT — ASTHMA QUESTIONNAIRES
ACT_TOTALSCORE: 22
QUESTION_4 LAST FOUR WEEKS HOW OFTEN HAVE YOU USED YOUR RESCUE INHALER OR NEBULIZER MEDICATION (SUCH AS ALBUTEROL): NOT AT ALL
ACT_TOTALSCORE: 22
QUESTION_2 LAST FOUR WEEKS HOW OFTEN HAVE YOU HAD SHORTNESS OF BREATH: ONCE OR TWICE A WEEK
QUESTION_3 LAST FOUR WEEKS HOW OFTEN DID YOUR ASTHMA SYMPTOMS (WHEEZING, COUGHING, SHORTNESS OF BREATH, CHEST TIGHTNESS OR PAIN) WAKE YOU UP AT NIGHT OR EARLIER THAN USUAL IN THE MORNING: NOT AT ALL
QUESTION_1 LAST FOUR WEEKS HOW MUCH OF THE TIME DID YOUR ASTHMA KEEP YOU FROM GETTING AS MUCH DONE AT WORK, SCHOOL OR AT HOME: A LITTLE OF THE TIME
QUESTION_5 LAST FOUR WEEKS HOW WOULD YOU RATE YOUR ASTHMA CONTROL: WELL CONTROLLED

## 2024-03-18 ENCOUNTER — VIRTUAL VISIT (OUTPATIENT)
Dept: FAMILY MEDICINE | Facility: CLINIC | Age: 24
End: 2024-03-18
Payer: COMMERCIAL

## 2024-03-18 DIAGNOSIS — F51.01 PRIMARY INSOMNIA: ICD-10-CM

## 2024-03-18 DIAGNOSIS — E66.01 MORBID OBESITY (H): ICD-10-CM

## 2024-03-18 DIAGNOSIS — F42.2 MIXED OBSESSIONAL THOUGHTS AND ACTS: ICD-10-CM

## 2024-03-18 DIAGNOSIS — F41.1 GAD (GENERALIZED ANXIETY DISORDER): ICD-10-CM

## 2024-03-18 DIAGNOSIS — F33.9 MAJOR DEPRESSIVE DISORDER, RECURRENT EPISODE WITH ANXIOUS DISTRESS (H): ICD-10-CM

## 2024-03-18 PROCEDURE — 99213 OFFICE O/P EST LOW 20 MIN: CPT | Mod: 95 | Performed by: PHYSICIAN ASSISTANT

## 2024-03-18 RX ORDER — TRAZODONE HYDROCHLORIDE 100 MG/1
100 TABLET ORAL AT BEDTIME
Qty: 90 TABLET | Refills: 3 | Status: SHIPPED | OUTPATIENT
Start: 2024-03-18

## 2024-03-18 NOTE — PROGRESS NOTES
Jarad is a 23 year old who is being evaluated via a billable video visit.    How would you like to obtain your AVS? [employer]  If the video visit is dropped, the invitation should be resent by: Text to cell phone: [phone]  Will anyone else be joining your video visit? No        Subjective   Jarad is a 23 year old, presenting for the following health issues:  Recheck Medication      3/18/2024     4:34 PM   Additional Questions   Roomed by Patient completed questions via [employer]     History of Present Illness       Reason for visit:  Medications    He eats 0-1 servings of fruits and vegetables daily.He consumes 3 sweetened beverage(s) daily.He exercises with enough effort to increase his heart rate 20 to 29 minutes per day.  He exercises with enough effort to increase his heart rate 5 days per week.   He is taking medications regularly.     Has lost 50 lbs on ozempic. Working out more consistently.  Depression is well controlled.  Sleeping well with trazodone.   Anxiety and ocd symptoms very stable.     Was experiencing some postural hypotension.  Stopped his lotrel and his symptoms resolved.      Review of Systems  Constitutional, neuro, ENT, endocrine, pulmonary, cardiac, gastrointestinal, genitourinary, musculoskeletal, integument and psychiatric systems are negative, except as otherwise noted.      Objective           Vitals:  No vitals were obtained today due to virtual visit.    Physical Exam   GENERAL: alert and no distress  EYES: Eyes grossly normal to inspection.  No discharge or erythema, or obvious scleral/conjunctival abnormalities.  RESP: No audible wheeze, cough, or visible cyanosis.    SKIN: Visible skin clear. No significant rash, abnormal pigmentation or lesions.  NEURO: Cranial nerves grossly intact.  Mentation and speech appropriate for age.  PSYCH: Appropriate affect, tone, and pace of words    Jarad was seen today for recheck medication.    Diagnoses and all orders for this  visit:    Major depressive disorder, recurrent episode with anxious distress (H24)  -     brexpiprazole (REXULTI) 3 MG tablet; Take 1 tablet (3 mg) by mouth daily  -     traZODone (DESYREL) 100 MG tablet; Take 1 tablet (100 mg) by mouth at bedtime    FLORINDA (generalized anxiety disorder)  -     brexpiprazole (REXULTI) 3 MG tablet; Take 1 tablet (3 mg) by mouth daily  -     traZODone (DESYREL) 100 MG tablet; Take 1 tablet (100 mg) by mouth at bedtime    Mixed obsessional thoughts and acts  -     brexpiprazole (REXULTI) 3 MG tablet; Take 1 tablet (3 mg) by mouth daily  -     traZODone (DESYREL) 100 MG tablet; Take 1 tablet (100 mg) by mouth at bedtime    Morbid obesity (H)  -     Semaglutide, 2 MG/DOSE, (OZEMPIC) 8 MG/3ML pen; Inject 2 mg Subcutaneous every 7 days    Primary insomnia  -     traZODone (DESYREL) 100 MG tablet; Take 1 tablet (100 mg) by mouth at bedtime      Will have him remain off of lotrel. I'll see him for a physical and recheck of his blood pressure.   work on lifestyle modification        Video-Visit Details    Type of service:  Video Visit   Originating Location (pt. Location): Home    Distant Location (provider location):  On-site  Platform used for Video Visit: Patricio  Signed Electronically by: Davidson Perez PA-C

## 2024-03-21 ENCOUNTER — TELEPHONE (OUTPATIENT)
Dept: FAMILY MEDICINE | Facility: CLINIC | Age: 24
End: 2024-03-21
Payer: COMMERCIAL

## 2024-03-21 DIAGNOSIS — E66.01 MORBID OBESITY (H): ICD-10-CM

## 2024-03-21 DIAGNOSIS — F90.0 ATTENTION DEFICIT HYPERACTIVITY DISORDER (ADHD), PREDOMINANTLY INATTENTIVE TYPE: ICD-10-CM

## 2024-03-21 NOTE — TELEPHONE ENCOUNTER
Patient calling, he had a video visit with PCP 3 days ago.   Says Davidson asked him how his ADHD med was doing.    Patient says  it had been managed by a provider at his college but now he needs Davidson to send refill as he is out of school.     I verified the Vyvanse dosing.    Hany'd Rx and pharmacy.  Routed to Davidson Perez to address refill.    Joselyn GUERRERO RN  [facility] Triage

## 2024-03-21 NOTE — TELEPHONE ENCOUNTER
Patient calling, [facility] [place]   He says [facility] never is able to get this medication.      Re-sent now.    Joselyn GUERRERO RN  [facility] Triage

## 2024-03-22 RX ORDER — LISDEXAMFETAMINE DIMESYLATE 50 MG/1
50 CAPSULE ORAL EVERY MORNING
Qty: 30 CAPSULE | Refills: 0 | Status: SHIPPED | OUTPATIENT
Start: 2024-03-22 | End: 2024-05-29

## 2024-04-10 ENCOUNTER — TELEPHONE (OUTPATIENT)
Dept: FAMILY MEDICINE | Facility: CLINIC | Age: 24
End: 2024-04-10
Payer: COMMERCIAL

## 2024-04-10 NOTE — TELEPHONE ENCOUNTER
Forms received from: [employer]    Phone number listed: [phone]   Fax listed: [phone]  Date received: 4/9/24  Form description: [employer] patient assistance   Once forms are completed, please return to [employer]  via fax.  Is patient requesting to be contacted when forms are completed: na  Phone: na  Form placed:  Davidson Paul

## 2024-04-12 ENCOUNTER — TELEPHONE (OUTPATIENT)
Dept: FAMILY MEDICINE | Facility: CLINIC | Age: 24
End: 2024-04-12
Payer: COMMERCIAL

## 2024-04-12 DIAGNOSIS — E66.01 MORBID OBESITY (H): ICD-10-CM

## 2024-04-12 NOTE — TELEPHONE ENCOUNTER
Patient requesting prescription for Semaglutide, 2 MG/DOSE, (OZEMPIC) 8 MG/3ML pen to be sent to [facility] on [address]. Pharmacy pended. Previous pharmacy sent to medication is on back order for 3 months. Please advise.

## 2024-04-17 NOTE — TELEPHONE ENCOUNTER
Smiley from [employer] is calling to check on the status of this form that was faxed to clinic.  Patient is needing this medication so are hoping to get this form back soon.    Please call Smiley at [phone]   ext [phone].  Just as an FYI. The phone number and fax number are the same number.    LEVY Briones  [facility]

## 2024-05-06 ENCOUNTER — TELEPHONE (OUTPATIENT)
Dept: FAMILY MEDICINE | Facility: CLINIC | Age: 24
End: 2024-05-06
Payer: COMMERCIAL

## 2024-05-06 NOTE — TELEPHONE ENCOUNTER
Patient came into clinic 5/6/24. Insurance is requesting information from PCP and declining to approve medication until they get this information. Please fax when completed [phone].  Nadine Flor Registration.  Thank you!

## 2024-05-09 DIAGNOSIS — E66.01 MORBID OBESITY (H): ICD-10-CM

## 2024-05-09 RX ORDER — SEMAGLUTIDE 2.68 MG/ML
INJECTION, SOLUTION SUBCUTANEOUS
Qty: 9 ML | Refills: 1 | OUTPATIENT
Start: 2024-05-09

## 2024-05-28 SDOH — HEALTH STABILITY: PHYSICAL HEALTH: ON AVERAGE, HOW MANY DAYS PER WEEK DO YOU ENGAGE IN MODERATE TO STRENUOUS EXERCISE (LIKE A BRISK WALK)?: 5 DAYS

## 2024-05-28 SDOH — HEALTH STABILITY: PHYSICAL HEALTH: ON AVERAGE, HOW MANY MINUTES DO YOU ENGAGE IN EXERCISE AT THIS LEVEL?: 40 MIN

## 2024-05-28 ASSESSMENT — PATIENT HEALTH QUESTIONNAIRE - PHQ9
SUM OF ALL RESPONSES TO PHQ QUESTIONS 1-9: 5
SUM OF ALL RESPONSES TO PHQ QUESTIONS 1-9: 5
10. IF YOU CHECKED OFF ANY PROBLEMS, HOW DIFFICULT HAVE THESE PROBLEMS MADE IT FOR YOU TO DO YOUR WORK, TAKE CARE OF THINGS AT HOME, OR GET ALONG WITH OTHER PEOPLE: SOMEWHAT DIFFICULT

## 2024-05-28 ASSESSMENT — SOCIAL DETERMINANTS OF HEALTH (SDOH): HOW OFTEN DO YOU GET TOGETHER WITH FRIENDS OR RELATIVES?: ONCE A WEEK

## 2024-05-29 ENCOUNTER — OFFICE VISIT (OUTPATIENT)
Dept: FAMILY MEDICINE | Facility: CLINIC | Age: 24
End: 2024-05-29
Payer: COMMERCIAL

## 2024-05-29 VITALS
HEIGHT: 68 IN | DIASTOLIC BLOOD PRESSURE: 70 MMHG | RESPIRATION RATE: 18 BRPM | OXYGEN SATURATION: 99 % | BODY MASS INDEX: 37.31 KG/M2 | HEART RATE: 70 BPM | WEIGHT: 246.2 LBS | TEMPERATURE: 97.4 F | SYSTOLIC BLOOD PRESSURE: 118 MMHG

## 2024-05-29 DIAGNOSIS — Z13.1 SCREENING FOR DIABETES MELLITUS: ICD-10-CM

## 2024-05-29 DIAGNOSIS — F41.1 GAD (GENERALIZED ANXIETY DISORDER): ICD-10-CM

## 2024-05-29 DIAGNOSIS — F90.0 ATTENTION DEFICIT HYPERACTIVITY DISORDER (ADHD), PREDOMINANTLY INATTENTIVE TYPE: ICD-10-CM

## 2024-05-29 DIAGNOSIS — E66.01 MORBID OBESITY (H): ICD-10-CM

## 2024-05-29 DIAGNOSIS — Z13.220 LIPID SCREENING: ICD-10-CM

## 2024-05-29 DIAGNOSIS — F33.1 MODERATE EPISODE OF RECURRENT MAJOR DEPRESSIVE DISORDER (H): ICD-10-CM

## 2024-05-29 DIAGNOSIS — Z91.010 PEANUT ALLERGY: ICD-10-CM

## 2024-05-29 DIAGNOSIS — J45.30 MILD PERSISTENT ASTHMA WITHOUT COMPLICATION: ICD-10-CM

## 2024-05-29 DIAGNOSIS — Z00.00 ROUTINE GENERAL MEDICAL EXAMINATION AT A HEALTH CARE FACILITY: Primary | ICD-10-CM

## 2024-05-29 LAB
CHOLEST SERPL-MCNC: 123 MG/DL
FASTING STATUS PATIENT QL REPORTED: YES
FASTING STATUS PATIENT QL REPORTED: YES
GLUCOSE SERPL-MCNC: 83 MG/DL (ref 70–99)
HDLC SERPL-MCNC: 39 MG/DL
LDLC SERPL CALC-MCNC: 69 MG/DL
NONHDLC SERPL-MCNC: 84 MG/DL
TRIGL SERPL-MCNC: 75 MG/DL

## 2024-05-29 PROCEDURE — 90715 TDAP VACCINE 7 YRS/> IM: CPT | Performed by: PHYSICIAN ASSISTANT

## 2024-05-29 PROCEDURE — 90677 PCV20 VACCINE IM: CPT | Performed by: PHYSICIAN ASSISTANT

## 2024-05-29 PROCEDURE — 90471 IMMUNIZATION ADMIN: CPT | Performed by: PHYSICIAN ASSISTANT

## 2024-05-29 PROCEDURE — 80061 LIPID PANEL: CPT | Performed by: PHYSICIAN ASSISTANT

## 2024-05-29 PROCEDURE — 99395 PREV VISIT EST AGE 18-39: CPT | Mod: 25 | Performed by: PHYSICIAN ASSISTANT

## 2024-05-29 PROCEDURE — 82947 ASSAY GLUCOSE BLOOD QUANT: CPT | Performed by: PHYSICIAN ASSISTANT

## 2024-05-29 PROCEDURE — 36415 COLL VENOUS BLD VENIPUNCTURE: CPT | Performed by: PHYSICIAN ASSISTANT

## 2024-05-29 PROCEDURE — 90472 IMMUNIZATION ADMIN EACH ADD: CPT | Performed by: PHYSICIAN ASSISTANT

## 2024-05-29 PROCEDURE — 99214 OFFICE O/P EST MOD 30 MIN: CPT | Mod: 25 | Performed by: PHYSICIAN ASSISTANT

## 2024-05-29 RX ORDER — ESCITALOPRAM OXALATE 10 MG/1
10 TABLET ORAL
Qty: 90 TABLET | Refills: 1 | Status: SHIPPED | OUTPATIENT
Start: 2024-05-29 | End: 2024-09-09

## 2024-05-29 RX ORDER — LISDEXAMFETAMINE DIMESYLATE 50 MG/1
50 CAPSULE ORAL EVERY MORNING
Qty: 30 CAPSULE | Refills: 0 | Status: SHIPPED | OUTPATIENT
Start: 2024-05-29 | End: 2024-09-09

## 2024-05-29 RX ORDER — ESCITALOPRAM OXALATE 10 MG/1
1 TABLET ORAL
COMMUNITY
Start: 2024-05-09 | End: 2024-05-29

## 2024-05-29 RX ORDER — EPINEPHRINE 0.3 MG/.3ML
0.3 INJECTION SUBCUTANEOUS PRN
Qty: 2 EACH | Refills: 0 | Status: SHIPPED | OUTPATIENT
Start: 2024-05-29

## 2024-05-29 RX ORDER — ALBUTEROL SULFATE 90 UG/1
1-2 AEROSOL, METERED RESPIRATORY (INHALATION) EVERY 6 HOURS PRN
Qty: 8.5 G | Refills: 3 | Status: SHIPPED | OUTPATIENT
Start: 2024-05-29

## 2024-05-29 ASSESSMENT — PAIN SCALES - GENERAL: PAINLEVEL: NO PAIN (0)

## 2024-05-29 NOTE — PROGRESS NOTES
Preventive Care Visit  [facility] SALVADOR Perez PA-C, Family Medicine  May 29, 2024        Sun Abraham is a 23 year old, presenting for the following:  Physical and Recheck Medication        5/29/2024     8:29 AM   Additional Questions   Roomed by Brandy   Accompanied by self        Health Care Directive  Patient does not have a Health Care Directive or Living Will: Discussed advance care planning with patient; however, patient declined at this time.    HPI  Recheck of depression/anxiety and adhd. Doing well. Taking and tolerating meds. No anhedonia. Sleeping great.  Recheck of obesity. Has lost 45 lbs. Blood pressure looks great. Exercising more.         5/28/2024   General Health   How would you rate your overall physical health? (!) FAIR   Feel stress (tense, anxious, or unable to sleep) To some extent   (!) STRESS CONCERN      5/28/2024   Nutrition   Three or more servings of calcium each day? Yes   Diet: I don't know   How many servings of fruit and vegetables per day? (!) 2-3   How many sweetened beverages each day? (!) 2         5/28/2024   Exercise   Days per week of moderate/strenous exercise 5 days   Average minutes spent exercising at this level 40 min         5/28/2024   Social Factors   Frequency of gathering with friends or relatives Once a week   Worry food won't last until get money to buy more No   Food not last or not have enough money for food? No   Do you have housing?  Yes   Are you worried about losing your housing? No   Lack of transportation? No   Unable to get utilities (heat,electricity)? No         5/28/2024   Dental   Dentist two times every year? Yes         5/28/2024   TB Screening   Were you born outside of the [place]? No       Today's PHQ-9 Score:       5/28/2024    10:46 AM   PHQ-9 SCORE   PHQ-9 Total Score MyChart 5 (Mild depression)   PHQ-9 Total Score 5 5/28/2024   Substance Use   Alcohol more than 3/day or more than 7/wk Not Applicable   Do  you use any other substances recreationally? No     Social History     Tobacco Use    Smoking status: Former     Current packs/day: 0.00     Types: Cigarettes     Passive exposure: Past    Smokeless tobacco: Never   Vaping Use    Vaping status: Former   Substance Use Topics    Alcohol use: Yes     Comment: social    Drug use: No             5/28/2024   One time HIV Screening   Previous HIV test? Decline         5/28/2024   STI Screening   New sexual partner(s) since last STI/HIV test? No         5/28/2024   Contraception/Family Planning   Questions about contraception or family planning No        Reviewed and updated as needed this visit by Provider                    Past Medical History:   Diagnosis Date    Depressive disorder     Uncomplicated asthma      No past surgical history on file.  Labs reviewed in EPIC  BP Readings from Last 3 Encounters:   05/29/24 118/70   11/18/22 (!) 145/92   07/27/22 (!) 143/84    Wt Readings from Last 3 Encounters:   05/29/24 111.7 kg (246 lb 3.2 oz)   11/18/22 117.9 kg (260 lb)   07/27/22 118 kg (260 lb 3.2 oz)                  Patient Active Problem List   Diagnosis    Mild persistent asthma    Peanut allergy    FLORINDA (generalized anxiety disorder)    Attention deficit disorder, unspecified hyperactivity presence    Primary insomnia    Moderate episode of recurrent major depressive disorder (H)     No past surgical history on file.    Social History     Tobacco Use    Smoking status: Former     Current packs/day: 0.00     Types: Cigarettes     Passive exposure: Past    Smokeless tobacco: Never   Substance Use Topics    Alcohol use: Yes     Comment: social     Family History   Problem Relation Age of Onset    Anxiety Disorder Mother     Anxiety Disorder Brother     Anxiety Disorder Brother     Heart Disease Maternal Grandfather         Pacemaker    Breast Cancer Maternal Grandmother     Heart Disease Paternal Grandfather          Current Outpatient Medications   Medication Sig  "Dispense Refill    albuterol (PROAIR HFA/PROVENTIL HFA/VENTOLIN HFA) 108 (90 Base) MCG/ACT inhaler INHALE 1 TO 2 PUFFS INTO THE LUNGS EVERY 4 HOURS AS NEEDED FOR SHORTNESS OF BREATH OR DIFFICULT BREATHING OR WHEEZING 8.5 g 0    brexpiprazole (REXULTI) 3 MG tablet Take 1 tablet (3 mg) by mouth daily 90 tablet 1    cloNIDine (CATAPRES) 0.2 MG tablet Take 0.2 mg by mouth nightly as needed      EPINEPHrine (ANY BX GENERIC EQUIV) 0.3 MG/0.3ML injection 2-pack INJECT INTRAMUSCULARLY ONCE AS DIRECTED FOR ANAPHYLAXIS 2 each 0    escitalopram (LEXAPRO) 10 MG tablet Take 1 tablet by mouth daily at 2 pm      lisdexamfetamine (VYVANSE) 50 MG capsule Take 1 capsule (50 mg) by mouth every morning 30 capsule 0    Semaglutide, 2 MG/DOSE, (OZEMPIC) 8 MG/3ML pen Inject 2 mg Subcutaneous every 7 days 9 mL 1    traZODone (DESYREL) 100 MG tablet Take 1 tablet (100 mg) by mouth at bedtime 90 tablet 3     Allergies   Allergen Reactions    Peanuts [Nuts] Anaphylaxis, Nausea and Vomiting and Swelling     Recent Labs   Lab Test 06/14/22  0754 05/01/18  0901   * 97   HDL 54 54   TRIG 47 112*   CR 0.74  --    GFRESTIMATED >90  --    POTASSIUM 4.3  --    TSH 0.49  --           Review of Systems  Constitutional, HEENT, cardiovascular, pulmonary, GI, , musculoskeletal, neuro, skin, endocrine and psych systems are negative, except as otherwise noted.     Objective    Exam  /70   Pulse 70   Temp 97.4  F (36.3  C) (Temporal)   Resp 18   Ht 1.736 m (5' 8.35\")   Wt 111.7 kg (246 lb 3.2 oz)   SpO2 99%   BMI 37.06 kg/m     Estimated body mass index is 37.06 kg/m  as calculated from the following:    Height as of this encounter: 1.736 m (5' 8.35\").    Weight as of this encounter: 111.7 kg (246 lb 3.2 oz).    Physical Exam  GENERAL: alert and no distress  EYES: Eyes grossly normal to inspection, PERRL and conjunctivae and sclerae normal  HENT: ear canals and TM's normal, nose and mouth without ulcers or lesions  NECK: no adenopathy, " no asymmetry, masses, or scars  RESP: lungs clear to auscultation - no rales, rhonchi or wheezes  CV: regular rate and rhythm, normal S1 S2, no S3 or S4, no murmur, click or rub, no peripheral edema  ABDOMEN: soft, nontender, no hepatosplenomegaly, no masses and bowel sounds normal  MS: no gross musculoskeletal defects noted, no edema  SKIN: no suspicious lesions or rashes  NEURO: Normal strength and tone, mentation intact and speech normal  PSYCH: mentation appears normal, affect normal/bright    Jarad was seen today for physical and recheck medication.    Diagnoses and all orders for this visit:    Routine general medical examination at a health care facility    Mild persistent asthma without complication  -     albuterol (PROAIR HFA/PROVENTIL HFA/VENTOLIN HFA) 108 (90 Base) MCG/ACT inhaler; Inhale 1-2 puffs into the lungs every 6 hours as needed for shortness of breath, wheezing or cough    Morbid obesity (H)  -     Semaglutide, 2 MG/DOSE, (OZEMPIC) 8 MG/3ML pen; Inject 2 mg Subcutaneous every 7 days    Attention deficit hyperactivity disorder (ADHD), predominantly inattentive type  -     lisdexamfetamine (VYVANSE) 50 MG capsule; Take 1 capsule (50 mg) by mouth every morning    Peanut allergy  -     EPINEPHrine (ANY BX GENERIC EQUIV) 0.3 MG/0.3ML injection 2-pack; Inject 0.3 mLs (0.3 mg) into the muscle as needed for anaphylaxis May repeat one time in 5-15 minutes if response to initial dose is inadequate.    Moderate episode of recurrent major depressive disorder (H)  -     escitalopram (LEXAPRO) 10 MG tablet; Take 1 tablet (10 mg) by mouth daily at 2 pm    FLORINDA (generalized anxiety disorder)  -     escitalopram (LEXAPRO) 10 MG tablet; Take 1 tablet (10 mg) by mouth daily at 2 pm    Lipid screening  -     Lipid panel reflex to direct LDL Fasting; Future    Screening for diabetes mellitus  -     Glucose; Future    Other orders  -     Pneumococcal 20 Valent Conjugate (Prevnar 20)  -     TDAP 10-64Y  (ADACEL,BOOSTRIX)      work on lifestyle modification      Signed Electronically by: Davidson Perez PA-C    Answers submitted by the patient for this visit:  Patient Health Questionnaire (Submitted on 5/28/2024)  If you checked off any problems, how difficult have these problems made it for you to do your work, take care of things at home, or get along with other people?: Somewhat difficult  PHQ9 TOTAL SCORE: 5

## 2024-05-30 ENCOUNTER — TELEPHONE (OUTPATIENT)
Dept: FAMILY MEDICINE | Facility: CLINIC | Age: 24
End: 2024-05-30

## 2024-05-30 NOTE — TELEPHONE ENCOUNTER
Prior Authorization Retail Medication Request    Medication/Dose: Semaglutide, 2 MG/DOSE, (OZEMPIC) 8 MG/3ML pen  Diagnosis and ICD code (if different than what is on RX):  E66.01   New/renewal/insurance change PA/secondary ins. PA:  Previously Tried and Failed:  na  Rationale:  na    Insurance   Primary:  [employer]  Insurance ID:  [id number]     Secondary (if applicable):na  Insurance ID:  na    Pharmacy Information (if different than what is on RX)  Name:   [facility]   Phone:  [phone]  Fax:      [phone]

## 2024-06-05 NOTE — TELEPHONE ENCOUNTER
Called pharmacy department at [phone]. They will be sending me a PA form via fax to start the PA.    Thank you,     Jovan Ramirez [facility]  Pharmacy Clinic [place]   [facility] [email or web address]   Phone: [phone]  Fax: [phone]

## 2024-06-06 NOTE — TELEPHONE ENCOUNTER
PA Initiation    Medication: OZEMPIC (2 MG/DOSE) 8 MG/3ML SC SOPN  Insurance Company: [employer] ([facility]) - Phone [phone] Fax [phone]  [facility] the [id number] - [place], [place] - [address] [facility] AT [facility] [address]  Filling Pharmacy Phone: [phone]  Filling Pharmacy Fax: [phone]  Start Date: 6/6/2024     Faxed completed PA form to [employer] at [phone]. Fax confirmed sent.      Thank you,     Jovan Ramirez [facility]  Pharmacy Clinic [email or web address] [email or web address]   Phone: [phone]  Fax: [phone]

## 2024-06-11 NOTE — TELEPHONE ENCOUNTER
Called [employer] [phone]. They stated this is in clinical review and there will be a decision made within 24-72 business hours.    Thank you,     Jovan Ramirez [facility]  Pharmacy Clinic [facility]  [email or web address]   Phone: [phone]  Fax: [phone]

## 2024-06-13 NOTE — TELEPHONE ENCOUNTER
PRIOR AUTHORIZATION DENIED    Medication: OZEM[facility] (2 MG/DOSE) [facility]  Insurance Company: [employer] ([facility]) - Phone [phone] Fax [phone]  Denial Date: 6/12/2024  Denial Reason(s): Only approved for type 2 diabetes  Appeal Information:     Patient Notified: No          Thank you,     Jovan Ramirez [facility]  Pharmacy Clinic [facility]  [email or web address]   Phone: [phone]  Fax: [phone]

## 2024-09-09 ENCOUNTER — OFFICE VISIT (OUTPATIENT)
Dept: FAMILY MEDICINE | Facility: CLINIC | Age: 24
End: 2024-09-09
Payer: COMMERCIAL

## 2024-09-09 VITALS
HEIGHT: 68 IN | SYSTOLIC BLOOD PRESSURE: 128 MMHG | RESPIRATION RATE: 16 BRPM | DIASTOLIC BLOOD PRESSURE: 82 MMHG | TEMPERATURE: 98.3 F | BODY MASS INDEX: 33.71 KG/M2 | OXYGEN SATURATION: 100 % | WEIGHT: 222.4 LBS | HEART RATE: 77 BPM

## 2024-09-09 DIAGNOSIS — F90.0 ATTENTION DEFICIT HYPERACTIVITY DISORDER (ADHD), PREDOMINANTLY INATTENTIVE TYPE: ICD-10-CM

## 2024-09-09 DIAGNOSIS — F41.1 GAD (GENERALIZED ANXIETY DISORDER): Primary | ICD-10-CM

## 2024-09-09 DIAGNOSIS — E66.01 MORBID OBESITY (H): ICD-10-CM

## 2024-09-09 PROCEDURE — 99213 OFFICE O/P EST LOW 20 MIN: CPT | Performed by: PHYSICIAN ASSISTANT

## 2024-09-09 RX ORDER — VENLAFAXINE HYDROCHLORIDE 75 MG/1
75 CAPSULE, EXTENDED RELEASE ORAL DAILY
Qty: 60 CAPSULE | Refills: 0 | Status: SHIPPED | OUTPATIENT
Start: 2024-09-09

## 2024-09-09 RX ORDER — VENLAFAXINE HYDROCHLORIDE 37.5 MG/1
CAPSULE, EXTENDED RELEASE ORAL
Qty: 7 CAPSULE | Refills: 0 | Status: SHIPPED | OUTPATIENT
Start: 2024-09-09

## 2024-09-09 RX ORDER — LISDEXAMFETAMINE DIMESYLATE 50 MG/1
50 CAPSULE ORAL EVERY MORNING
Qty: 30 CAPSULE | Refills: 0 | Status: SHIPPED | OUTPATIENT
Start: 2024-09-09

## 2024-09-09 NOTE — PROGRESS NOTES
"    Subjective   Jarad is a 23 year old, presenting for the following health issues:  Recheck Medication        9/9/2024     1:27 PM   Additional Questions   Roomed by Emilie Randhawa CMA   Accompanied by None         9/9/2024     1:27 PM   Patient Reported Additional Medications   Patient reports taking the following new medications none     History of Present Illness       Reason for visit:  Medication/check up He is missing 1 dose(s) of medications per week.  He is not taking prescribed medications regularly due to remembering to take.     Noting more anxiety . Worrying a lot more.   Vyvanse working well to manage his adhd target symptoms.   No depression concerns    Sleeping fine. Relies on his trazodone one or two noc's per week.     Recheck of obesity. Working out. Has lost a lot of weight with wegovy.   Exercises consistently.         Review of Systems  Constitutional, HEENT, cardiovascular, pulmonary, GI, , musculoskeletal, neuro, skin, endocrine and psych systems are negative, except as otherwise noted.      Objective    /82   Pulse 77   Temp 98.3  F (36.8  C) (Oral)   Resp 16   Ht 1.73 m (5' 8.11\")   Wt 100.9 kg (222 lb 6.4 oz)   SpO2 100%   BMI 33.71 kg/m    Body mass index is 33.71 kg/m .  Physical Exam   Eye exam - right eye normal lid, conjunctiva, cornea, pupil and fundus, left eye normal lid, conjunctiva, cornea, pupil and fundus.  Thyroid not palpable, not enlarged, no nodules detected.  CHEST:chest clear to IPPA, no tachypnea, retractions or cyanosis, and S1, S2 normal, no murmur, no gallop, rate regular.  Jarad was seen today for recheck medication.    Diagnoses and all orders for this visit:    FLORINDA (generalized anxiety disorder)  -     venlafaxine (EFFEXOR XR) 37.5 MG 24 hr capsule; Take 1 tab daily for 1 week, then increase to the 75 mg per day dose  -     venlafaxine (EFFEXOR XR) 75 MG 24 hr capsule; Take 1 capsule (75 mg) by mouth daily.    Attention deficit hyperactivity " disorder (ADHD), predominantly inattentive type  -     lisdexamfetamine (VYVANSE) 50 MG capsule; Take 1 capsule (50 mg) by mouth every morning.  -     venlafaxine (EFFEXOR XR) 75 MG 24 hr capsule; Take 1 capsule (75 mg) by mouth daily.    Morbid obesity (H)  -     Semaglutide, 1 MG/DOSE, (OZEMPIC) 4 MG/3ML pen; Inject 1 mg subcutaneously every 7 days.    Other orders  -     REVIEW OF HEALTH MAINTENANCE PROTOCOL ORDERS      Discontinue lexapro. Start effexor.  Reduce the dose of wegovy to 1 mg per week.  Recheck in 4-6 wks.        Signed Electronically by: Davidson Perez PA-C

## 2024-11-15 ENCOUNTER — TELEPHONE (OUTPATIENT)
Dept: FAMILY MEDICINE | Facility: CLINIC | Age: 24
End: 2024-11-15
Payer: COMMERCIAL

## 2024-11-15 DIAGNOSIS — F42.2 MIXED OBSESSIONAL THOUGHTS AND ACTS: ICD-10-CM

## 2024-11-15 DIAGNOSIS — F41.1 GAD (GENERALIZED ANXIETY DISORDER): ICD-10-CM

## 2024-11-15 DIAGNOSIS — F33.9 MAJOR DEPRESSIVE DISORDER, RECURRENT EPISODE WITH ANXIOUS DISTRESS (H): ICD-10-CM

## 2024-11-15 NOTE — TELEPHONE ENCOUNTER
Routing message to provider.    [facility] patient assistance group calling to request patients prescription refill for brexpiprazole (REXULTI) 3 MG tablet.    They assist patient in getting medications cheaper.    Pharmacy pended if agreeable.    Christine M Klisch, RN

## 2024-11-16 NOTE — TELEPHONE ENCOUNTER
Rx has been routed to his pharmacy  , but he is due for a recheck/med check . Help him to schedule an appt.

## 2024-11-18 NOTE — TELEPHONE ENCOUNTER
Called the patient @ [phone]. I have scheduled him to see Davidson DESOUZA on 12/2/2024.  Ch[facility] Coyle Registration

## 2024-11-27 ASSESSMENT — ASTHMA QUESTIONNAIRES
ACT_TOTALSCORE: 18
QUESTION_3 LAST FOUR WEEKS HOW OFTEN DID YOUR ASTHMA SYMPTOMS (WHEEZING, COUGHING, SHORTNESS OF BREATH, CHEST TIGHTNESS OR PAIN) WAKE YOU UP AT NIGHT OR EARLIER THAN USUAL IN THE MORNING: ONCE OR TWICE
ACT_TOTALSCORE: 18
QUESTION_2 LAST FOUR WEEKS HOW OFTEN HAVE YOU HAD SHORTNESS OF BREATH: ONCE OR TWICE A WEEK
QUESTION_4 LAST FOUR WEEKS HOW OFTEN HAVE YOU USED YOUR RESCUE INHALER OR NEBULIZER MEDICATION (SUCH AS ALBUTEROL): ONCE A WEEK OR LESS
QUESTION_1 LAST FOUR WEEKS HOW MUCH OF THE TIME DID YOUR ASTHMA KEEP YOU FROM GETTING AS MUCH DONE AT WORK, SCHOOL OR AT HOME: MOST OF THE TIME
QUESTION_5 LAST FOUR WEEKS HOW WOULD YOU RATE YOUR ASTHMA CONTROL: WELL CONTROLLED

## 2024-12-02 ENCOUNTER — OFFICE VISIT (OUTPATIENT)
Dept: FAMILY MEDICINE | Facility: CLINIC | Age: 24
End: 2024-12-02
Payer: COMMERCIAL

## 2024-12-02 VITALS
OXYGEN SATURATION: 100 % | WEIGHT: 210.2 LBS | HEART RATE: 93 BPM | RESPIRATION RATE: 20 BRPM | BODY MASS INDEX: 31.13 KG/M2 | SYSTOLIC BLOOD PRESSURE: 118 MMHG | TEMPERATURE: 98.4 F | DIASTOLIC BLOOD PRESSURE: 80 MMHG | HEIGHT: 69 IN

## 2024-12-02 DIAGNOSIS — F90.0 ATTENTION DEFICIT HYPERACTIVITY DISORDER (ADHD), PREDOMINANTLY INATTENTIVE TYPE: ICD-10-CM

## 2024-12-02 DIAGNOSIS — F33.1 MODERATE EPISODE OF RECURRENT MAJOR DEPRESSIVE DISORDER (H): ICD-10-CM

## 2024-12-02 DIAGNOSIS — J45.30 MILD PERSISTENT ASTHMA WITHOUT COMPLICATION: ICD-10-CM

## 2024-12-02 DIAGNOSIS — F41.1 GAD (GENERALIZED ANXIETY DISORDER): ICD-10-CM

## 2024-12-02 DIAGNOSIS — F42.2 MIXED OBSESSIONAL THOUGHTS AND ACTS: ICD-10-CM

## 2024-12-02 DIAGNOSIS — F33.9 MAJOR DEPRESSIVE DISORDER, RECURRENT EPISODE WITH ANXIOUS DISTRESS (H): ICD-10-CM

## 2024-12-02 PROCEDURE — 99213 OFFICE O/P EST LOW 20 MIN: CPT | Performed by: PHYSICIAN ASSISTANT

## 2024-12-02 RX ORDER — LISDEXAMFETAMINE DIMESYLATE 50 MG/1
50 CAPSULE ORAL EVERY MORNING
Qty: 30 CAPSULE | Refills: 0 | Status: SHIPPED | OUTPATIENT
Start: 2024-12-02

## 2024-12-02 RX ORDER — VENLAFAXINE HYDROCHLORIDE 150 MG/1
150 CAPSULE, EXTENDED RELEASE ORAL DAILY
Qty: 90 CAPSULE | Refills: 0 | Status: SHIPPED | OUTPATIENT
Start: 2024-12-02

## 2024-12-02 RX ORDER — ALBUTEROL SULFATE 90 UG/1
1-2 INHALANT RESPIRATORY (INHALATION) EVERY 6 HOURS PRN
Qty: 8.5 G | Refills: 3 | Status: SHIPPED | OUTPATIENT
Start: 2024-12-02

## 2024-12-02 NOTE — LETTER
My Asthma Action Plan    Name: Jarad Nolasco   YOB: 2000  Date: 12/2/2024   My doctor: Davidson Perez PA-C   My clinic: [facility]        My Control Medicine:   My Rescue Medicine: Albuterol (Proair/Ventolin/Proventil HFA) 2-4 puffs EVERY 4 HOURS as needed. Use a spacer if recommended by your provider.   My Asthma Severity:   Mild Persistent  Know your asthma triggers:   upper respiratory infections            GREEN ZONE   Good Control  I feel good  No cough or wheeze  Can work, sleep and play without asthma symptoms       Take your asthma control medicine every day.     If exercise triggers your asthma, take your rescue medication  15 minutes before exercise or sports, and  During exercise if you have asthma symptoms  Spacer to use with inhaler: If you have a spacer, make sure to use it with your inhaler             YELLOW ZONE Getting Worse  I have ANY of these:  I do not feel good  Cough or wheeze  Chest feels tight  Wake up at night   Keep taking your Green Zone medications  Start taking your rescue medicine:  every 20 minutes for up to 1 hour. Then every 4 hours for 24-48 hours.  If you stay in the Yellow Zone for more than 12-24 hours, contact your doctor.  If you do not return to the Green Zone in 12-24 hours or you get worse, start taking your oral steroid medicine if prescribed by your provider.           RED ZONE Medical Alert - Get Help  I have ANY of these:  I feel awful  Medicine is not helping  Breathing getting harder  Trouble walking or talking  Nose opens wide to breathe       Take your rescue medicine NOW  If your provider has prescribed an oral steroid medicine, start taking it NOW  Call your doctor NOW  If you are still in the Red Zone after 20 minutes and you have not reached your doctor:  Take your rescue medicine again and  Call 911 or go to the emergency room right away    See your regular doctor within 2 weeks of an Emergency Room or Urgent Care  visit for follow-up treatment.          Annual Reminders:  Meet with Asthma Educator,  Flu Shot in the Fall, consider Pneumonia Vaccination for patients with asthma (aged 19 and older).    Pharmacy:    [id number] - [place], [place] - [address] AT [facility] [address]  [id number] - [place], [place] - [address]    Electronically signed by Davidson Perez PA-C   Date: 12/02/24                      Asthma Triggers  How To Control Things That Make Your Asthma Worse    Triggers are things that make your asthma worse.  Look at the list below to help you find your triggers and what you can do about them.  You can help prevent asthma flare-ups by staying away from your triggers.      Trigger                                                          What you can do   Cigarette Smoke  Tobacco smoke can make asthma worse. Do not allow smoking in your home, car or around you.  Be sure no one smokes at a child s day care or school.  If you smoke, ask your health care provider for ways to help you quit.  Ask family members to quit too.  Ask your health care provider for a referral to Quit Plan to help you quit smoking, or call 5-607-596-PLAN.     Colds, Flu, Bronchitis  These are common triggers of asthma. Wash your hands often.  Don t touch your eyes, nose or mouth.  Get a flu shot every year.     Dust Mites  These are tiny bugs that live in cloth or carpet. They are too small to see. Wash sheets and blankets in hot water every week.   Encase pillows and mattress in dust mite proof covers.  Avoid having carpet if you can. If you have carpet, vacuum weekly.   Use a dust mask and HEPA vacuum.   Pollen and Outdoor Mold  Some people are allergic to trees, grass, or weed pollen, or molds. Try to keep your windows closed.  Limit time out doors when pollen count is high.   Ask you health care provider about taking medicine during allergy season.     Animal Dander  Some people are allergic to  skin flakes, urine or saliva from pets with fur or feathers. Keep pets with fur or feathers out of your home.    If you can t keep the pet outdoors, then keep the pet out of your bedroom.  Keep the bedroom door closed.  Keep pets off cloth furniture and away from stuffed toys.     Mice, Rats, and Cockroaches   Some people are allergic to the waste from these pests.   Cover food and garbage.  Clean up spills and food crumbs.  Store grease in the refrigerator.   Keep food out of the bedroom.   Indoor Mold  This can be a trigger if your home has high moisture. Fix leaking faucets, pipes, or other sources of water.   Clean moldy surfaces.  Dehumidify basement if it is damp and smelly.   Smoke, Strong Odors, and Sprays  These can reduce air quality. Stay away from strong odors and sprays, such as perfume, powder, hair spray, paints, smoke incense, paint, cleaning products, candles and new carpet.   Exercise or Sports  Some people with asthma have this trigger. Be active!  Ask your doctor about taking medicine before sports or exercise to prevent symptoms.    Warm up for 5-10 minutes before and after sports or exercise.     Other Triggers of Asthma  Cold air:  Cover your nose and mouth with a scarf.  Sometimes laughing or crying can be a trigger.  Some medicines and food can trigger asthma.

## 2024-12-02 NOTE — PROGRESS NOTES
"    Subjective   Jarad is a 24 year old, presenting for the following health issues:  Recheck Medication        12/2/2024     8:10 AM   Additional Questions   Roomed by Emilie Randhawa CMA   Accompanied by None         12/2/2024     8:10 AM   Patient Reported Additional Medications   Patient reports taking the following new medications none     History of Present Illness       Reason for visit:  Follow up and provider requested a visit for medication refill   He is taking medications regularly.     Effexor has helped his anxiety more than lexapro. Still noting some anxiety. Initiating counseling  Depression is doing well as is his adhd. No anhedonia. Sleeping ok.        Review of Systems  Constitutional, HEENT, cardiovascular, pulmonary, GI, , musculoskeletal, neuro, skin, endocrine and psych systems are negative, except as otherwise noted.      Objective    /80   Pulse 93   Temp 98.4  F (36.9  C) (Oral)   Resp 20   Ht 1.74 m (5' 8.5\")   Wt 95.3 kg (210 lb 3.2 oz)   SpO2 100%   BMI 31.49 kg/m    Body mass index is 31.49 kg/m .  Physical Exam   Eye exam - right eye normal lid, conjunctiva, cornea, pupil and fundus, left eye normal lid, conjunctiva, cornea, pupil and fundus.  Thyroid not palpable, not enlarged, no nodules detected.  CHEST:chest clear to IPPA, no tachypnea, retractions or cyanosis, and S1, S2 normal, no murmur, no gallop, rate regular.    Jarad was seen today for recheck medication.    Diagnoses and all orders for this visit:    Moderate episode of recurrent major depressive disorder (H)    Major depressive disorder, recurrent episode with anxious distress (H)  -     brexpiprazole (REXULTI) 3 MG tablet; Take 1 tablet (3 mg) by mouth daily.    FLORINDA (generalized anxiety disorder)  -     brexpiprazole (REXULTI) 3 MG tablet; Take 1 tablet (3 mg) by mouth daily.  -     venlafaxine (EFFEXOR XR) 150 MG 24 hr capsule; Take 1 capsule (150 mg) by mouth daily.    Mixed obsessional thoughts and " acts  -     brexpiprazole (REXULTI) 3 MG tablet; Take 1 tablet (3 mg) by mouth daily.    Mild persistent asthma without complication  -     albuterol (PROAIR HFA/PROVENTIL HFA/VENTOLIN HFA) 108 (90 Base) MCG/ACT inhaler; Inhale 1-2 puffs into the lungs every 6 hours as needed for shortness of breath, wheezing or cough.    Attention deficit hyperactivity disorder (ADHD), predominantly inattentive type  -     lisdexamfetamine (VYVANSE) 50 MG capsule; Take 1 capsule (50 mg) by mouth every morning.  -     venlafaxine (EFFEXOR XR) 150 MG 24 hr capsule; Take 1 capsule (150 mg) by mouth daily.    Other orders  -     Cancel: INFLUENZA VACCINE,SPLIT VIRUS,TRIVALENT,PF(FLUZONE)      Increased effiexor to 150 mg daily.  Continue all other meds.   Recheck in 2-3 mos     Signed Electronically by: Davidson Perez PA-C

## 2024-12-05 DIAGNOSIS — E66.01 MORBID OBESITY (H): ICD-10-CM

## 2024-12-05 NOTE — TELEPHONE ENCOUNTER
Please confirm with patient that he is wanting medication re-sent per request below. If patient confirms, then send to PCP to approve.    Judy with [facility] order [facility] is calling requesting to have prescription for Semaglutide, 1 MG/DOSE, (OZEMPIC) 4 MG/3ML pen sent to [facility] - [address] for a 90 day supply.  [facility] pharmacy is a retail pharmacy. They ([facility] do not accept e-prescriptions directly. Med has to be sent to [facility] who they partner with in order for them to mail prescription. They require 90 day supply for all mail order prescriptions.    If any questions, please call [phone].    Laly Stokes RN

## 2024-12-12 NOTE — TELEPHONE ENCOUNTER
Spoke with patient he would like to have prescription sent to [facility] - [place], [place] - [address] [place] [address] for a 90 day supply.

## 2024-12-26 ENCOUNTER — TELEPHONE (OUTPATIENT)
Dept: FAMILY MEDICINE | Facility: CLINIC | Age: 24
End: 2024-12-26
Payer: COMMERCIAL

## 2024-12-26 NOTE — TELEPHONE ENCOUNTER
Patient Quality Outreach    Patient is due for the following:   Asthma  -  ACT needed    Action(s) Taken:   Patient was assigned appropriate questionnaire to complete    Type of outreach:    Sent Easel message.    Questions for provider review:    None           Emilie Randhawa

## 2025-02-20 ENCOUNTER — OFFICE VISIT (OUTPATIENT)
Dept: FAMILY MEDICINE | Facility: CLINIC | Age: 25
End: 2025-02-20
Payer: COMMERCIAL

## 2025-02-20 VITALS
SYSTOLIC BLOOD PRESSURE: 126 MMHG | TEMPERATURE: 97.7 F | OXYGEN SATURATION: 100 % | BODY MASS INDEX: 33.59 KG/M2 | RESPIRATION RATE: 16 BRPM | WEIGHT: 221.6 LBS | HEART RATE: 82 BPM | HEIGHT: 68 IN | DIASTOLIC BLOOD PRESSURE: 87 MMHG

## 2025-02-20 DIAGNOSIS — F42.2 MIXED OBSESSIONAL THOUGHTS AND ACTS: ICD-10-CM

## 2025-02-20 DIAGNOSIS — F33.9 MAJOR DEPRESSIVE DISORDER, RECURRENT EPISODE WITH ANXIOUS DISTRESS: ICD-10-CM

## 2025-02-20 DIAGNOSIS — F90.0 ATTENTION DEFICIT HYPERACTIVITY DISORDER (ADHD), PREDOMINANTLY INATTENTIVE TYPE: Primary | ICD-10-CM

## 2025-02-20 DIAGNOSIS — F41.1 GAD (GENERALIZED ANXIETY DISORDER): ICD-10-CM

## 2025-02-20 RX ORDER — LISDEXAMFETAMINE DIMESYLATE 50 MG/1
50 CAPSULE ORAL EVERY MORNING
Qty: 30 CAPSULE | Refills: 0 | Status: SHIPPED | OUTPATIENT
Start: 2025-02-20

## 2025-02-20 RX ORDER — VENLAFAXINE HYDROCHLORIDE 75 MG/1
CAPSULE, EXTENDED RELEASE ORAL
COMMUNITY
Start: 2025-01-03

## 2025-02-20 RX ORDER — VENLAFAXINE HYDROCHLORIDE 150 MG/1
150 CAPSULE, EXTENDED RELEASE ORAL DAILY
Qty: 90 CAPSULE | Refills: 1 | Status: SHIPPED | OUTPATIENT
Start: 2025-02-20

## 2025-02-20 ASSESSMENT — ASTHMA QUESTIONNAIRES
QUESTION_2 LAST FOUR WEEKS HOW OFTEN HAVE YOU HAD SHORTNESS OF BREATH: ONCE OR TWICE A WEEK
QUESTION_4 LAST FOUR WEEKS HOW OFTEN HAVE YOU USED YOUR RESCUE INHALER OR NEBULIZER MEDICATION (SUCH AS ALBUTEROL): NOT AT ALL
ACT_TOTALSCORE: 22
QUESTION_1 LAST FOUR WEEKS HOW MUCH OF THE TIME DID YOUR ASTHMA KEEP YOU FROM GETTING AS MUCH DONE AT WORK, SCHOOL OR AT HOME: A LITTLE OF THE TIME
QUESTION_3 LAST FOUR WEEKS HOW OFTEN DID YOUR ASTHMA SYMPTOMS (WHEEZING, COUGHING, SHORTNESS OF BREATH, CHEST TIGHTNESS OR PAIN) WAKE YOU UP AT NIGHT OR EARLIER THAN USUAL IN THE MORNING: NOT AT ALL
QUESTION_5 LAST FOUR WEEKS HOW WOULD YOU RATE YOUR ASTHMA CONTROL: WELL CONTROLLED
ACT_TOTALSCORE: 22

## 2025-02-20 ASSESSMENT — PATIENT HEALTH QUESTIONNAIRE - PHQ9
SUM OF ALL RESPONSES TO PHQ QUESTIONS 1-9: 8
10. IF YOU CHECKED OFF ANY PROBLEMS, HOW DIFFICULT HAVE THESE PROBLEMS MADE IT FOR YOU TO DO YOUR WORK, TAKE CARE OF THINGS AT HOME, OR GET ALONG WITH OTHER PEOPLE: SOMEWHAT DIFFICULT
SUM OF ALL RESPONSES TO PHQ QUESTIONS 1-9: 8

## 2025-02-20 NOTE — LETTER
February 20, 2025      Jarad Nolasco  [address]  [place] [address]        To Whom It May Concern:    Ilia is taking Rexulti to help treat his major depression (ICD code: F33.9), Generalized Anxiety Disorder (F41.1) and Mixed Obsessive Thoughts and Acts (F42.2). Rexulti has proven very effective in treating these aforementioned conditions.      Sincerely,      Davidson Perez      Electronically signed

## 2025-02-20 NOTE — PROGRESS NOTES
Sun Abraham is a 24 year old, presenting for the following health issues:  Recheck Medication (Vyvanse)        2/20/2025     8:49 AM   Additional Questions   Roomed by Emilie Randhawa CMA   Accompanied by None         2/20/2025     8:49 AM   Patient Reported Additional Medications   Patient reports taking the following new medications none     History of Present Illness       Reason for visit:  Medication and paperwork He is missing 1 dose(s) of medications per week.     Adhd symptoms well controlled with vyvanse. No side effects   Continued to titrate effexor. At 225 mg/d currently and his anxiety and depression are much better.  Rexulti has proven helpful as well.  Recheck of his blood pressure. Borderline high today. Could be med related.   Please answer the questions below, rating yourself on each of the criteria shown using the scale on the right side of the page. As you answer each question, place an X in the box that best describes how you have felt and conducted yourself over the past 6 months.     Never Rarely Sometimes Often Very Often   1 How often do you have trouble wrapping up the final details of a project once the challenging parts have been done?   X     2 How often do you have difficulty getting things in order when you have to do a task that requires organization?  X      3 How often do you have problems remembering appointments or obligations?   X     4 When you have a task that requires a lot of thought, how often do you avoid or delay getting started?   X     5 How often do you fidget or squirm with your hands or feet when you have to sit down for a long time?  X      6 How often do you feel overly active and compelled to do things, like you were driven by a motor?   X     7 How often do you make careless mistakes when you have to work on a boring or difficult project?  X      8 How often do you have difficulty keeping your attention when you are doing boring or repetitive work?    "X     9 How often do you have difficulty concentrating on what people say to you, even when they are speaking to you directly?   X     10 How often do you misplace or have difficulty finding things at home or at work?  X      11 How often are you distracted by activity or noise around you?   X     12 How often do you leave your seat in meetings or other situations in which you are expected to remain seated?   X     13 How often do you feel restless or fidgety?  X      14 How often do you have difficulty unwinding and relaxing when you have time to yourself?   X     15 How often do you find yourself talking too much when you are in social situations?  X      16 When you're in a conversation, how often do you find yourself finishing the sentences of the people you are talking to, before they can finish them themselves?    X    17 How often do you have difficulty waiting your turn in situations when turn-taking is required?  X      18 How often do you interrupt others when they are busy?   X        Review of Systems  Constitutional, HEENT, cardiovascular, pulmonary, GI, , musculoskeletal, neuro, skin, endocrine and psych systems are negative, except as otherwise noted.      Objective    /87   Pulse 82   Temp 97.7  F (36.5  C) (Oral)   Resp 16   Ht 1.735 m (5' 8.31\")   Wt 100.5 kg (221 lb 9.6 oz)   SpO2 100%   BMI 33.39 kg/m    Body mass index is 33.39 kg/m .  Physical Exam   Eye exam - right eye normal lid, conjunctiva, cornea, pupil and fundus, left eye normal lid, conjunctiva, cornea, pupil and fundus.  Thyroid not palpable, not enlarged, no nodules detected.  CHEST:chest clear to IPPA, no tachypnea, retractions or cyanosis, and S1, S2 normal, no murmur, no gallop, rate regular.  Jarad was seen today for recheck medication.    Diagnoses and all orders for this visit:    Attention deficit hyperactivity disorder (ADHD), predominantly inattentive type  -     lisdexamfetamine (VYVANSE) 50 MG capsule; " Take 1 capsule (50 mg) by mouth every morning.  -     venlafaxine (EFFEXOR XR) 150 MG 24 hr capsule; Take 1 capsule (150 mg) by mouth daily.    Major depressive disorder, recurrent episode with anxious distress    FLORINDA (generalized anxiety disorder)  -     venlafaxine (EFFEXOR XR) 150 MG 24 hr capsule; Take 1 capsule (150 mg) by mouth daily.    Mixed obsessional thoughts and acts    Continue all meds  Lower sodium diet  Exercise  Recheck in 6 mos     Signed Electronically by: Davidson Perez PA-C

## 2025-02-21 DIAGNOSIS — F90.0 ATTENTION DEFICIT HYPERACTIVITY DISORDER (ADHD), PREDOMINANTLY INATTENTIVE TYPE: ICD-10-CM

## 2025-02-21 DIAGNOSIS — E66.01 MORBID OBESITY (H): ICD-10-CM

## 2025-03-07 DIAGNOSIS — F90.0 ATTENTION DEFICIT HYPERACTIVITY DISORDER (ADHD), PREDOMINANTLY INATTENTIVE TYPE: ICD-10-CM

## 2025-03-07 DIAGNOSIS — F41.1 GAD (GENERALIZED ANXIETY DISORDER): ICD-10-CM

## 2025-03-07 NOTE — TELEPHONE ENCOUNTER
Pt calling in regarding his vyvanse prescription.     Pts [facility] is unable to fill due to not having it in stock. Pt has been reaching out to other [facility] with no luck in finding a pharmacy that has medication in stock.    Pt is wondering if PCP could write him a paper copy to him.     RN also advised pt to try pharmacies that are not [facility] he may have a better luck find a pharmacy that has it in stock. RN recommended the [place] [facility] and [facility].     Pt stated that he will call those to see, but would still like a message sent  to providers. RN did inform pt that provider is out of office today, but RN can still route the message to covering providers.     RN stated to pt that Rn is unsure if they would be agreeable to writing a paper prescription it is up to providers discretion but a message can still be sent.     Danita Bright, LILIANE

## 2025-03-07 NOTE — TELEPHONE ENCOUNTER
Pt calling back about Vyvanse Rx.     Pt stated that the [facility] in [place] had the prescription in stock an is wondering if a covering provider can transfer the prescription. Pt leaves for work on Monday 3/10.     Medication and new pharmacy pended for provider review. If willing to send.    Danita Bright RN

## 2025-03-08 RX ORDER — VENLAFAXINE HYDROCHLORIDE 150 MG/1
150 CAPSULE, EXTENDED RELEASE ORAL DAILY
Qty: 90 CAPSULE | Refills: 1 | Status: SHIPPED | OUTPATIENT
Start: 2025-03-08

## 2025-03-10 RX ORDER — LISDEXAMFETAMINE DIMESYLATE 50 MG/1
50 CAPSULE ORAL EVERY MORNING
Qty: 30 CAPSULE | Refills: 0 | Status: SHIPPED | OUTPATIENT
Start: 2025-03-10

## 2025-03-10 RX ORDER — LISDEXAMFETAMINE DIMESYLATE 50 MG/1
50 CAPSULE ORAL EVERY MORNING
Qty: 30 CAPSULE | Refills: 0 | Status: CANCELLED | OUTPATIENT
Start: 2025-03-10

## 2025-04-29 ENCOUNTER — PATIENT OUTREACH (OUTPATIENT)
Dept: CARE COORDINATION | Facility: CLINIC | Age: 25
End: 2025-04-29
Payer: COMMERCIAL

## 2025-05-13 ENCOUNTER — PATIENT OUTREACH (OUTPATIENT)
Dept: CARE COORDINATION | Facility: CLINIC | Age: 25
End: 2025-05-13
Payer: COMMERCIAL

## 2025-05-29 ENCOUNTER — VIRTUAL VISIT (OUTPATIENT)
Dept: FAMILY MEDICINE | Facility: CLINIC | Age: 25
End: 2025-05-29
Payer: COMMERCIAL

## 2025-05-29 DIAGNOSIS — F33.9 MAJOR DEPRESSIVE DISORDER, RECURRENT EPISODE WITH ANXIOUS DISTRESS: ICD-10-CM

## 2025-05-29 DIAGNOSIS — F41.1 GAD (GENERALIZED ANXIETY DISORDER): ICD-10-CM

## 2025-05-29 DIAGNOSIS — F90.0 ATTENTION DEFICIT HYPERACTIVITY DISORDER (ADHD), PREDOMINANTLY INATTENTIVE TYPE: ICD-10-CM

## 2025-05-29 DIAGNOSIS — F42.2 MIXED OBSESSIONAL THOUGHTS AND ACTS: ICD-10-CM

## 2025-05-29 RX ORDER — VENLAFAXINE HYDROCHLORIDE 150 MG/1
300 CAPSULE, EXTENDED RELEASE ORAL DAILY
COMMUNITY
Start: 2025-05-29

## 2025-05-29 RX ORDER — HYDROXYZINE HYDROCHLORIDE 25 MG/1
1 TABLET, FILM COATED ORAL
COMMUNITY
Start: 2025-05-23

## 2025-05-29 RX ORDER — LISDEXAMFETAMINE DIMESYLATE 50 MG/1
50 CAPSULE ORAL EVERY MORNING
Qty: 30 CAPSULE | Refills: 0 | Status: SHIPPED | OUTPATIENT
Start: 2025-05-29

## 2025-05-29 ASSESSMENT — ANXIETY QUESTIONNAIRES
5. BEING SO RESTLESS THAT IT IS HARD TO SIT STILL: NOT AT ALL
7. FEELING AFRAID AS IF SOMETHING AWFUL MIGHT HAPPEN: NOT AT ALL
GAD7 TOTAL SCORE: 9
GAD7 TOTAL SCORE: 9
6. BECOMING EASILY ANNOYED OR IRRITABLE: SEVERAL DAYS
1. FEELING NERVOUS, ANXIOUS, OR ON EDGE: MORE THAN HALF THE DAYS
3. WORRYING TOO MUCH ABOUT DIFFERENT THINGS: NEARLY EVERY DAY
IF YOU CHECKED OFF ANY PROBLEMS ON THIS QUESTIONNAIRE, HOW DIFFICULT HAVE THESE PROBLEMS MADE IT FOR YOU TO DO YOUR WORK, TAKE CARE OF THINGS AT HOME, OR GET ALONG WITH OTHER PEOPLE: SOMEWHAT DIFFICULT
2. NOT BEING ABLE TO STOP OR CONTROL WORRYING: MORE THAN HALF THE DAYS

## 2025-05-29 ASSESSMENT — PATIENT HEALTH QUESTIONNAIRE - PHQ9
SUM OF ALL RESPONSES TO PHQ QUESTIONS 1-9: 1
5. POOR APPETITE OR OVEREATING: SEVERAL DAYS

## 2025-05-29 NOTE — PROGRESS NOTES
Jarad is a 24 year old who is being evaluated via a billable video visit.    How would you like to obtain your AVS? MyChart  If the video visit is dropped, the invitation should be resent by: Text to cell phone: [phone]  Will anyone else be joining your video visit? No        Subjective   Jarad is a 24 year old, presenting for the following health issues:  No chief complaint on file.      5/29/2025     3:08 PM   Additional Questions   Roomed by Emilie Randhawa CMA   Accompanied by None         5/29/2025     3:08 PM   Patient Reported Additional Medications   Patient reports taking the following new medications Takes 300mg venlafexine each day     HPI      Anxiety   How are you doing with your anxiety since your last visit? Improved  because he increased medication  Are you having other symptoms that might be associated with anxiety? No  Have you had a significant life event? No   Are you feeling depressed? Yes:  sometimes  Do you have any concerns with your use of alcohol or other drugs? No  Effexor and rexulti are working well.  Vyvanse working to control target symptoms of adhd.  No side effects to meds.   Social History     Tobacco Use    Smoking status: Former     Current packs/day: 0.00     Types: Cigarettes     Passive exposure: Past    Smokeless tobacco: Never   Vaping Use    Vaping status: Former   Substance Use Topics    Alcohol use: Yes     Comment: social    Drug use: No         12/24/2021     9:12 AM 6/17/2022     5:49 AM 8/10/2022     3:47 PM   FLORINDA-7 SCORE   Total Score  15 (severe anxiety) 5 (mild anxiety)   Total Score 7 15    15 5         5/28/2024    10:46 AM 12/2/2024     7:48 AM 2/20/2025     8:18 AM   PHQ   PHQ-9 Total Score 5 6  8    Q9: Thoughts of better off dead/self-harm past 2 weeks Not at all Not at all Not at all       Patient-reported         2/20/2025     8:18 AM   Last PHQ-9   1.  Little interest or pleasure in doing things 1   2.  Feeling down, depressed, or hopeless 1   3.   Trouble falling or staying asleep, or sleeping too much 1   4.  Feeling tired or having little energy 2   5.  Poor appetite or overeating 1   6.  Feeling bad about yourself 0   7.  Trouble concentrating 1   8.  Moving slowly or restless 1   Q9: Thoughts of better off dead/self-harm past 2 weeks 0   PHQ-9 Total Score 8        Patient-reported         8/10/2022     3:47 PM   FLORINDA-7    1. Feeling nervous, anxious, or on edge 1   2. Not being able to stop or control worrying 1   3. Worrying too much about different things 1   4. Trouble relaxing 1   5. Being so restless that it is hard to sit still 1   6. Becoming easily annoyed or irritable 0   7. Feeling afraid, as if something awful might happen 0   FLORINDA-7 Total Score 5   If you checked any problems, how difficult have they made it for you to do your work, take care of things at home, or get along with other people? Somewhat difficult           Review of Systems  Constitutional, HEENT, cardiovascular, pulmonary, GI, , musculoskeletal, neuro, skin, endocrine and psych systems are negative, except as otherwise noted.      Objective           Vitals:  No vitals were obtained today due to virtual visit.    Physical Exam   GENERAL: alert and no distress  EYES: Eyes grossly normal to inspection.  No discharge or erythema, or obvious scleral/conjunctival abnormalities.  RESP: No audible wheeze, cough, or visible cyanosis.    SKIN: Visible skin clear. No significant rash, abnormal pigmentation or lesions.  NEURO: Cranial nerves grossly intact.  Mentation and speech appropriate for age.  PSYCH: Appropriate affect, tone, and pace of words    Jarad was seen today for mental health referral automated outreach.    Diagnoses and all orders for this visit:    Attention deficit hyperactivity disorder (ADHD), predominantly inattentive type  -     lisdexamfetamine (VYVANSE) 50 MG capsule; Take 1 capsule (50 mg) by mouth every morning.    FLORINDA (generalized anxiety disorder)  -      brexpiprazole (REXULTI) 3 MG tablet; Take 1 tablet (3 mg) by mouth daily.    Major depressive disorder, recurrent episode with anxious distress  -     brexpiprazole (REXULTI) 3 MG tablet; Take 1 tablet (3 mg) by mouth daily.    Mixed obsessional thoughts and acts  -     brexpiprazole (REXULTI) 3 MG tablet; Take 1 tablet (3 mg) by mouth daily.      work on lifestyle modification  Lower fat, higher fiber diet and consistent exercise.  Continue to work on a lower sugar/starch diet and more exercise.  Recheck in 6 mos        Video-Visit Details    Type of service:  Video Visit   Originating Location (pt. Location): Home    Distant Location (provider location):  On-site  Platform used for Video Visit: Albert  Signed Electronically by: Davidson Perez PA-C

## 2025-07-12 ENCOUNTER — HEALTH MAINTENANCE LETTER (OUTPATIENT)
Age: 25
End: 2025-07-12

## 2025-08-20 DIAGNOSIS — F42.2 MIXED OBSESSIONAL THOUGHTS AND ACTS: ICD-10-CM

## 2025-08-20 DIAGNOSIS — F33.9 MAJOR DEPRESSIVE DISORDER, RECURRENT EPISODE WITH ANXIOUS DISTRESS: ICD-10-CM

## 2025-08-20 DIAGNOSIS — F41.1 GAD (GENERALIZED ANXIETY DISORDER): ICD-10-CM
